# Patient Record
Sex: FEMALE | Race: BLACK OR AFRICAN AMERICAN | NOT HISPANIC OR LATINO | Employment: FULL TIME | URBAN - METROPOLITAN AREA
[De-identification: names, ages, dates, MRNs, and addresses within clinical notes are randomized per-mention and may not be internally consistent; named-entity substitution may affect disease eponyms.]

---

## 2019-02-19 ENCOUNTER — OFFICE VISIT (OUTPATIENT)
Dept: ENDOCRINOLOGY | Facility: CLINIC | Age: 34
End: 2019-02-19
Payer: COMMERCIAL

## 2019-02-19 ENCOUNTER — HOSPITAL ENCOUNTER (OUTPATIENT)
Dept: RADIOLOGY | Facility: HOSPITAL | Age: 34
Discharge: HOME | End: 2019-02-19
Attending: NURSE PRACTITIONER
Payer: COMMERCIAL

## 2019-02-19 VITALS
SYSTOLIC BLOOD PRESSURE: 128 MMHG | WEIGHT: 203.94 LBS | HEART RATE: 79 BPM | BODY MASS INDEX: 32.78 KG/M2 | DIASTOLIC BLOOD PRESSURE: 82 MMHG | HEIGHT: 66 IN

## 2019-02-19 DIAGNOSIS — E01.0 THYROMEGALY: Primary | ICD-10-CM

## 2019-02-19 DIAGNOSIS — E01.0 THYROMEGALY: ICD-10-CM

## 2019-02-19 PROCEDURE — 76536 US EXAM OF HEAD AND NECK: CPT | Mod: TC

## 2019-02-19 PROCEDURE — 3008F BODY MASS INDEX DOCD: CPT | Mod: CPTII,S$GLB,, | Performed by: NURSE PRACTITIONER

## 2019-02-19 PROCEDURE — 99999 PR PBB SHADOW E&M-NEW PATIENT-LVL III: CPT | Mod: PBBFAC,,, | Performed by: NURSE PRACTITIONER

## 2019-02-19 PROCEDURE — 99204 PR OFFICE/OUTPT VISIT, NEW, LEVL IV, 45-59 MIN: ICD-10-PCS | Mod: S$GLB,,, | Performed by: NURSE PRACTITIONER

## 2019-02-19 PROCEDURE — 99204 OFFICE O/P NEW MOD 45 MIN: CPT | Mod: S$GLB,,, | Performed by: NURSE PRACTITIONER

## 2019-02-19 PROCEDURE — 3008F PR BODY MASS INDEX (BMI) DOCUMENTED: ICD-10-PCS | Mod: CPTII,S$GLB,, | Performed by: NURSE PRACTITIONER

## 2019-02-19 PROCEDURE — 99999 PR PBB SHADOW E&M-NEW PATIENT-LVL III: ICD-10-PCS | Mod: PBBFAC,,, | Performed by: NURSE PRACTITIONER

## 2019-02-19 PROCEDURE — 76536 US EXAM OF HEAD AND NECK: CPT | Mod: 26,,, | Performed by: RADIOLOGY

## 2019-02-19 PROCEDURE — 76536 US SOFT TISSUE HEAD NECK THYROID: ICD-10-PCS | Mod: 26,,, | Performed by: RADIOLOGY

## 2019-02-19 RX ORDER — AMLODIPINE BESYLATE 5 MG/1
TABLET ORAL
COMMUNITY
Start: 2018-02-12 | End: 2021-08-20

## 2019-02-19 RX ORDER — LISINOPRIL 10 MG/1
TABLET ORAL
COMMUNITY
Start: 2017-09-07 | End: 2021-08-20

## 2019-02-19 RX ORDER — METFORMIN HYDROCHLORIDE 500 MG/5ML
500 SOLUTION ORAL DAILY
COMMUNITY
Start: 2018-07-09 | End: 2021-08-20

## 2019-02-19 RX ORDER — NEBIVOLOL 10 MG/1
TABLET ORAL
COMMUNITY
Start: 2018-10-01 | End: 2021-08-20

## 2019-02-19 NOTE — PROGRESS NOTES
Subjective:      Patient ID: Karuna Sandoval is a 34 y.o. female.    Chief Complaint:  Thyroid Problem (New patient )      History of Present Illness  Karuna Sandoval presents today for Evaluation & management of a possible thyroid nodule. This is her first visit with me.     The thyroid nodule was found on palpation by pcp   No thyroid u/s done     The patient was not aware of the thyroid nodule prior   No difficulty breathing   + dysphagia- liquids   + voice changes- hoarseness   + FH of thyroid cancer- maternal aunt   No personal history of radiation treatment or exposure     No signs or symptoms of hyper or hypothyroidism    + weight gain, then loss without trying   No bowel changes  No heat or cold intolerance   No hair nail or skin changes  No cp, palpations or sob    Per pt TFTs were checked & were normal.     + thyroid tenderness    She went to the gynecologist & all her hormones were normal. She had a partial hysterectomy.     On metformin for elevated A1c from PCP.    Review of Systems   Constitutional: Positive for fatigue and unexpected weight change.   Eyes: Negative for visual disturbance.   Respiratory: Negative for cough and shortness of breath.    Cardiovascular: Negative for chest pain.   Gastrointestinal: Negative for abdominal pain.   Endocrine: Negative for cold intolerance, heat intolerance, polydipsia, polyphagia and polyuria.   Musculoskeletal: Negative for arthralgias.   Skin: Negative for wound.   Neurological: Negative for headaches.   Hematological: Does not bruise/bleed easily.   Psychiatric/Behavioral: Negative for sleep disturbance.     Objective:   Physical Exam   Constitutional: She appears well-developed.   HENT:   Right Ear: External ear normal.   Left Ear: External ear normal.   Nose: Nose normal.   Hearing Normal     Neck: No tracheal deviation present. Thyromegaly present.   Cardiovascular: Normal rate.   No murmur heard.  No edema present   Pulmonary/Chest: Effort  normal and breath sounds normal.   Abdominal: Soft. She exhibits no mass. No hernia.   Neurological: She is alert. No cranial nerve deficit or sensory deficit.   Skin: No rash noted.   No nodules.   Psychiatric: She has a normal mood and affect. Judgment normal.   Vitals reviewed.    Body mass index is 32.91 kg/m².    Lab Review:   No results found for: HGBA1C  No results found for: CHOL, HDL, LDLCALC, TRIG, CHOLHDL  No results found for: NA, K, CL, CO2, GLU, BUN, CREATININE, CALCIUM, PROT, ALBUMIN, BILITOT, ALKPHOS, AST, ALT, ANIONGAP, ESTGFRAFRICA, EGFRNONAA, TSH    Assessment and Plan     1. Thyromegaly  TSH    T4, free    Thyroid peroxidase antibody    Thyroid stimulating immunoglobulin    Vitamin D    Hemoglobin A1c    US Soft Tissue Head Neck Thyroid    Follicle stimulating hormone    Luteinizing hormone    Prolactin    Testosterone    DHEA-sulfate    17-Hydroxyprogesterone       Thyromegaly  -- Schedule thyroid US, check TFTs & antibodies today   -- if nodules found that meet criteria for biopsy will proceed with FNA. Discussed with patient.     -- above labs today, ensure that her hormone levels are normal per patient request   -- will message with results

## 2019-02-19 NOTE — ASSESSMENT & PLAN NOTE
-- Schedule thyroid US, check TFTs & antibodies today   -- if nodules found that meet criteria for biopsy will proceed with FNA. Discussed with patient.     -- above labs today, ensure that her hormone levels are normal per patient request   -- will message with results

## 2019-02-20 ENCOUNTER — PATIENT MESSAGE (OUTPATIENT)
Dept: ENDOCRINOLOGY | Facility: CLINIC | Age: 34
End: 2019-02-20

## 2019-02-25 DIAGNOSIS — E55.9 VITAMIN D DEFICIENCY: Primary | ICD-10-CM

## 2019-02-25 RX ORDER — ERGOCALCIFEROL 1.25 MG/1
50000 CAPSULE ORAL
Qty: 12 CAPSULE | Refills: 3 | Status: SHIPPED | OUTPATIENT
Start: 2019-02-25 | End: 2021-08-20

## 2021-04-16 ENCOUNTER — PATIENT MESSAGE (OUTPATIENT)
Dept: RESEARCH | Facility: HOSPITAL | Age: 36
End: 2021-04-16

## 2021-05-31 ENCOUNTER — HOSPITAL ENCOUNTER (EMERGENCY)
Facility: HOSPITAL | Age: 36
Discharge: HOME | End: 2021-05-31
Attending: EMERGENCY MEDICINE
Payer: COMMERCIAL

## 2021-05-31 VITALS
OXYGEN SATURATION: 99 % | BODY MASS INDEX: 32.95 KG/M2 | HEIGHT: 66 IN | TEMPERATURE: 99 F | SYSTOLIC BLOOD PRESSURE: 123 MMHG | DIASTOLIC BLOOD PRESSURE: 84 MMHG | RESPIRATION RATE: 18 BRPM | HEART RATE: 80 BPM | WEIGHT: 205 LBS

## 2021-05-31 DIAGNOSIS — R21 RASH AND NONSPECIFIC SKIN ERUPTION: Primary | ICD-10-CM

## 2021-05-31 PROCEDURE — 99284 EMERGENCY DEPT VISIT MOD MDM: CPT

## 2021-05-31 RX ORDER — PREDNISONE 50 MG/1
50 TABLET ORAL DAILY
Qty: 3 TABLET | Refills: 0 | Status: SHIPPED | OUTPATIENT
Start: 2021-06-01 | End: 2021-06-04

## 2021-05-31 RX ORDER — CEPHALEXIN 500 MG/1
500 CAPSULE ORAL EVERY 8 HOURS
Qty: 15 CAPSULE | Refills: 0 | Status: SHIPPED | OUTPATIENT
Start: 2021-05-31 | End: 2021-06-05

## 2021-06-02 ENCOUNTER — OFFICE VISIT (OUTPATIENT)
Dept: DERMATOLOGY | Facility: CLINIC | Age: 36
End: 2021-06-02
Payer: COMMERCIAL

## 2021-06-02 DIAGNOSIS — L73.9 FOLLICULITIS: ICD-10-CM

## 2021-06-02 DIAGNOSIS — L40.9 PSORIASIS: Primary | ICD-10-CM

## 2021-06-02 DIAGNOSIS — L25.9 CONTACT DERMATITIS, UNSPECIFIED CONTACT DERMATITIS TYPE, UNSPECIFIED TRIGGER: ICD-10-CM

## 2021-06-02 PROCEDURE — 99204 PR OFFICE/OUTPT VISIT, NEW, LEVL IV, 45-59 MIN: ICD-10-PCS | Mod: S$GLB,,, | Performed by: DERMATOLOGY

## 2021-06-02 PROCEDURE — 1126F PR PAIN SEVERITY QUANTIFIED, NO PAIN PRESENT: ICD-10-PCS | Mod: S$GLB,,, | Performed by: DERMATOLOGY

## 2021-06-02 PROCEDURE — 99204 OFFICE O/P NEW MOD 45 MIN: CPT | Mod: S$GLB,,, | Performed by: DERMATOLOGY

## 2021-06-02 PROCEDURE — 1126F AMNT PAIN NOTED NONE PRSNT: CPT | Mod: S$GLB,,, | Performed by: DERMATOLOGY

## 2021-06-02 PROCEDURE — 99999 PR PBB SHADOW E&M-EST. PATIENT-LVL III: CPT | Mod: PBBFAC,,, | Performed by: DERMATOLOGY

## 2021-06-02 PROCEDURE — 99999 PR PBB SHADOW E&M-EST. PATIENT-LVL III: ICD-10-PCS | Mod: PBBFAC,,, | Performed by: DERMATOLOGY

## 2021-06-02 RX ORDER — FLUOCINONIDE TOPICAL SOLUTION USP, 0.05% 0.5 MG/ML
SOLUTION TOPICAL 2 TIMES DAILY
Qty: 60 ML | Refills: 3 | Status: SHIPPED | OUTPATIENT
Start: 2021-06-02 | End: 2021-10-15

## 2021-06-02 RX ORDER — TRIAMCINOLONE ACETONIDE 1 MG/G
OINTMENT TOPICAL 2 TIMES DAILY
Qty: 454 G | Refills: 3 | Status: SHIPPED | OUTPATIENT
Start: 2021-06-02 | End: 2021-10-15

## 2021-06-02 RX ORDER — KETOCONAZOLE 20 MG/ML
SHAMPOO, SUSPENSION TOPICAL
Qty: 120 ML | Refills: 4 | Status: SHIPPED | OUTPATIENT
Start: 2021-06-03 | End: 2021-10-15

## 2021-06-05 ENCOUNTER — PATIENT MESSAGE (OUTPATIENT)
Dept: DERMATOLOGY | Facility: CLINIC | Age: 36
End: 2021-06-05

## 2021-06-09 ENCOUNTER — TELEPHONE (OUTPATIENT)
Dept: DERMATOLOGY | Facility: CLINIC | Age: 36
End: 2021-06-09

## 2021-06-09 ENCOUNTER — TELEPHONE (OUTPATIENT)
Dept: ALLERGY | Facility: CLINIC | Age: 36
End: 2021-06-09

## 2021-06-09 RX ORDER — HYDROXYZINE HYDROCHLORIDE 25 MG/1
25 TABLET, FILM COATED ORAL 3 TIMES DAILY PRN
Qty: 30 TABLET | Refills: 0 | Status: SHIPPED | OUTPATIENT
Start: 2021-06-09 | End: 2021-06-14 | Stop reason: SDUPTHER

## 2021-06-14 ENCOUNTER — OFFICE VISIT (OUTPATIENT)
Dept: DERMATOLOGY | Facility: CLINIC | Age: 36
End: 2021-06-14
Payer: COMMERCIAL

## 2021-06-14 DIAGNOSIS — L25.9 CONTACT DERMATITIS, UNSPECIFIED CONTACT DERMATITIS TYPE, UNSPECIFIED TRIGGER: Primary | ICD-10-CM

## 2021-06-14 DIAGNOSIS — L29.9 PRURITUS: ICD-10-CM

## 2021-06-14 PROCEDURE — 96372 THER/PROPH/DIAG INJ SC/IM: CPT | Mod: S$GLB,,, | Performed by: STUDENT IN AN ORGANIZED HEALTH CARE EDUCATION/TRAINING PROGRAM

## 2021-06-14 PROCEDURE — 96372 PR INJECTION,THERAP/PROPH/DIAG2ST, IM OR SUBCUT: ICD-10-PCS | Mod: S$GLB,,, | Performed by: STUDENT IN AN ORGANIZED HEALTH CARE EDUCATION/TRAINING PROGRAM

## 2021-06-14 PROCEDURE — 99214 OFFICE O/P EST MOD 30 MIN: CPT | Mod: 25,S$GLB,, | Performed by: DERMATOLOGY

## 2021-06-14 PROCEDURE — 99214 PR OFFICE/OUTPT VISIT, EST, LEVL IV, 30-39 MIN: ICD-10-PCS | Mod: 25,S$GLB,, | Performed by: DERMATOLOGY

## 2021-06-14 PROCEDURE — 99999 PR PBB SHADOW E&M-EST. PATIENT-LVL I: ICD-10-PCS | Mod: PBBFAC,,,

## 2021-06-14 PROCEDURE — 99999 PR PBB SHADOW E&M-EST. PATIENT-LVL I: CPT | Mod: PBBFAC,,,

## 2021-06-14 RX ORDER — HYDROXYZINE HYDROCHLORIDE 25 MG/1
25 TABLET, FILM COATED ORAL 3 TIMES DAILY PRN
Qty: 30 TABLET | Refills: 1 | Status: SHIPPED | OUTPATIENT
Start: 2021-06-14 | End: 2021-08-20

## 2021-06-14 RX ORDER — TRIAMCINOLONE ACETONIDE 40 MG/ML
40 INJECTION, SUSPENSION INTRA-ARTICULAR; INTRAMUSCULAR
Status: COMPLETED | OUTPATIENT
Start: 2021-06-14 | End: 2021-06-14

## 2021-06-14 RX ADMIN — TRIAMCINOLONE ACETONIDE 40 MG: 40 INJECTION, SUSPENSION INTRA-ARTICULAR; INTRAMUSCULAR at 02:06

## 2021-07-12 ENCOUNTER — CLINICAL SUPPORT (OUTPATIENT)
Dept: DERMATOLOGY | Facility: CLINIC | Age: 36
End: 2021-07-12
Payer: COMMERCIAL

## 2021-07-12 DIAGNOSIS — L25.9 CONTACT DERMATITIS, UNSPECIFIED CONTACT DERMATITIS TYPE, UNSPECIFIED TRIGGER: ICD-10-CM

## 2021-07-12 PROCEDURE — 95044 PATCH/APPLICATION TESTS: CPT | Mod: S$GLB,,, | Performed by: DERMATOLOGY

## 2021-07-12 PROCEDURE — 95044 PR ALLERGY PATCH TESTS: ICD-10-PCS | Mod: S$GLB,,, | Performed by: DERMATOLOGY

## 2021-07-14 ENCOUNTER — CLINICAL SUPPORT (OUTPATIENT)
Dept: DERMATOLOGY | Facility: CLINIC | Age: 36
End: 2021-07-14
Payer: COMMERCIAL

## 2021-07-14 DIAGNOSIS — L25.9 CONTACT DERMATITIS, UNSPECIFIED CONTACT DERMATITIS TYPE, UNSPECIFIED TRIGGER: Primary | ICD-10-CM

## 2021-07-14 PROCEDURE — 99212 PR OFFICE/OUTPT VISIT, EST, LEVL II, 10-19 MIN: ICD-10-PCS | Mod: S$GLB,,, | Performed by: DERMATOLOGY

## 2021-07-14 PROCEDURE — 99212 OFFICE O/P EST SF 10 MIN: CPT | Mod: S$GLB,,, | Performed by: DERMATOLOGY

## 2021-07-16 ENCOUNTER — CLINICAL SUPPORT (OUTPATIENT)
Dept: DERMATOLOGY | Facility: CLINIC | Age: 36
End: 2021-07-16
Payer: COMMERCIAL

## 2021-07-16 ENCOUNTER — OFFICE VISIT (OUTPATIENT)
Dept: URGENT CARE | Facility: CLINIC | Age: 36
End: 2021-07-16
Payer: COMMERCIAL

## 2021-07-16 VITALS
SYSTOLIC BLOOD PRESSURE: 141 MMHG | OXYGEN SATURATION: 98 % | TEMPERATURE: 99 F | RESPIRATION RATE: 16 BRPM | HEIGHT: 66 IN | HEART RATE: 95 BPM | DIASTOLIC BLOOD PRESSURE: 100 MMHG | WEIGHT: 167 LBS | BODY MASS INDEX: 26.84 KG/M2

## 2021-07-16 DIAGNOSIS — L25.9 CONTACT DERMATITIS, UNSPECIFIED CONTACT DERMATITIS TYPE, UNSPECIFIED TRIGGER: Primary | ICD-10-CM

## 2021-07-16 DIAGNOSIS — Z20.822 ENCOUNTER FOR LABORATORY TESTING FOR COVID-19 VIRUS: ICD-10-CM

## 2021-07-16 DIAGNOSIS — Z20.822 EXPOSURE TO COVID-19 VIRUS: Primary | ICD-10-CM

## 2021-07-16 LAB
CTP QC/QA: YES
SARS-COV-2 RDRP RESP QL NAA+PROBE: NEGATIVE

## 2021-07-16 PROCEDURE — 99212 PR OFFICE/OUTPT VISIT, EST, LEVL II, 10-19 MIN: ICD-10-PCS | Mod: S$GLB,,, | Performed by: DERMATOLOGY

## 2021-07-16 PROCEDURE — 99203 OFFICE O/P NEW LOW 30 MIN: CPT | Mod: S$GLB,,, | Performed by: NURSE PRACTITIONER

## 2021-07-16 PROCEDURE — 99212 OFFICE O/P EST SF 10 MIN: CPT | Mod: S$GLB,,, | Performed by: DERMATOLOGY

## 2021-07-16 PROCEDURE — 3008F PR BODY MASS INDEX (BMI) DOCUMENTED: ICD-10-PCS | Mod: CPTII,S$GLB,, | Performed by: NURSE PRACTITIONER

## 2021-07-16 PROCEDURE — U0002 COVID-19 LAB TEST NON-CDC: HCPCS | Mod: QW,S$GLB,, | Performed by: NURSE PRACTITIONER

## 2021-07-16 PROCEDURE — 3008F BODY MASS INDEX DOCD: CPT | Mod: CPTII,S$GLB,, | Performed by: NURSE PRACTITIONER

## 2021-07-16 PROCEDURE — U0002: ICD-10-PCS | Mod: QW,S$GLB,, | Performed by: NURSE PRACTITIONER

## 2021-07-16 PROCEDURE — 99203 PR OFFICE/OUTPT VISIT, NEW, LEVL III, 30-44 MIN: ICD-10-PCS | Mod: S$GLB,,, | Performed by: NURSE PRACTITIONER

## 2021-07-29 ENCOUNTER — OFFICE VISIT (OUTPATIENT)
Dept: URGENT CARE | Facility: CLINIC | Age: 36
End: 2021-07-29
Payer: COMMERCIAL

## 2021-07-29 VITALS
TEMPERATURE: 99 F | OXYGEN SATURATION: 96 % | SYSTOLIC BLOOD PRESSURE: 126 MMHG | RESPIRATION RATE: 18 BRPM | HEART RATE: 98 BPM | DIASTOLIC BLOOD PRESSURE: 93 MMHG

## 2021-07-29 DIAGNOSIS — J34.9 SINUS PROBLEM: Primary | ICD-10-CM

## 2021-07-29 DIAGNOSIS — J06.9 URI, ACUTE: ICD-10-CM

## 2021-07-29 LAB
CTP QC/QA: YES
SARS-COV-2 RDRP RESP QL NAA+PROBE: NEGATIVE

## 2021-07-29 PROCEDURE — 3074F PR MOST RECENT SYSTOLIC BLOOD PRESSURE < 130 MM HG: ICD-10-PCS | Mod: CPTII,S$GLB,, | Performed by: FAMILY MEDICINE

## 2021-07-29 PROCEDURE — 3080F PR MOST RECENT DIASTOLIC BLOOD PRESSURE >= 90 MM HG: ICD-10-PCS | Mod: CPTII,S$GLB,, | Performed by: FAMILY MEDICINE

## 2021-07-29 PROCEDURE — 1159F MED LIST DOCD IN RCRD: CPT | Mod: CPTII,S$GLB,, | Performed by: FAMILY MEDICINE

## 2021-07-29 PROCEDURE — 1159F PR MEDICATION LIST DOCUMENTED IN MEDICAL RECORD: ICD-10-PCS | Mod: CPTII,S$GLB,, | Performed by: FAMILY MEDICINE

## 2021-07-29 PROCEDURE — 3080F DIAST BP >= 90 MM HG: CPT | Mod: CPTII,S$GLB,, | Performed by: FAMILY MEDICINE

## 2021-07-29 PROCEDURE — 99213 PR OFFICE/OUTPT VISIT, EST, LEVL III, 20-29 MIN: ICD-10-PCS | Mod: S$GLB,,, | Performed by: FAMILY MEDICINE

## 2021-07-29 PROCEDURE — U0002 COVID-19 LAB TEST NON-CDC: HCPCS | Mod: QW,S$GLB,, | Performed by: FAMILY MEDICINE

## 2021-07-29 PROCEDURE — 1160F RVW MEDS BY RX/DR IN RCRD: CPT | Mod: CPTII,S$GLB,, | Performed by: FAMILY MEDICINE

## 2021-07-29 PROCEDURE — 1160F PR REVIEW ALL MEDS BY PRESCRIBER/CLIN PHARMACIST DOCUMENTED: ICD-10-PCS | Mod: CPTII,S$GLB,, | Performed by: FAMILY MEDICINE

## 2021-07-29 PROCEDURE — U0002: ICD-10-PCS | Mod: QW,S$GLB,, | Performed by: FAMILY MEDICINE

## 2021-07-29 PROCEDURE — 3074F SYST BP LT 130 MM HG: CPT | Mod: CPTII,S$GLB,, | Performed by: FAMILY MEDICINE

## 2021-07-29 PROCEDURE — 99213 OFFICE O/P EST LOW 20 MIN: CPT | Mod: S$GLB,,, | Performed by: FAMILY MEDICINE

## 2021-08-20 ENCOUNTER — OFFICE VISIT (OUTPATIENT)
Dept: FAMILY MEDICINE | Facility: CLINIC | Age: 36
End: 2021-08-20
Payer: COMMERCIAL

## 2021-08-20 VITALS
HEIGHT: 66 IN | DIASTOLIC BLOOD PRESSURE: 94 MMHG | WEIGHT: 207.25 LBS | BODY MASS INDEX: 33.31 KG/M2 | OXYGEN SATURATION: 97 % | SYSTOLIC BLOOD PRESSURE: 122 MMHG | HEART RATE: 81 BPM

## 2021-08-20 DIAGNOSIS — Z11.4 ENCOUNTER FOR SCREENING FOR HIV: ICD-10-CM

## 2021-08-20 DIAGNOSIS — R35.0 URINARY FREQUENCY: ICD-10-CM

## 2021-08-20 DIAGNOSIS — Z11.59 NEED FOR HEPATITIS C SCREENING TEST: ICD-10-CM

## 2021-08-20 DIAGNOSIS — F51.01 PRIMARY INSOMNIA: ICD-10-CM

## 2021-08-20 DIAGNOSIS — E66.09 CLASS 1 OBESITY DUE TO EXCESS CALORIES WITH SERIOUS COMORBIDITY AND BODY MASS INDEX (BMI) OF 33.0 TO 33.9 IN ADULT: ICD-10-CM

## 2021-08-20 DIAGNOSIS — Z11.3 ROUTINE SCREENING FOR STI (SEXUALLY TRANSMITTED INFECTION): ICD-10-CM

## 2021-08-20 DIAGNOSIS — I10 ESSENTIAL HYPERTENSION: Primary | ICD-10-CM

## 2021-08-20 PROCEDURE — 4010F PR ACE/ARB THEARPY RXD/TAKEN: ICD-10-PCS | Mod: CPTII,S$GLB,, | Performed by: FAMILY MEDICINE

## 2021-08-20 PROCEDURE — 3074F SYST BP LT 130 MM HG: CPT | Mod: CPTII,S$GLB,, | Performed by: FAMILY MEDICINE

## 2021-08-20 PROCEDURE — 3074F PR MOST RECENT SYSTOLIC BLOOD PRESSURE < 130 MM HG: ICD-10-PCS | Mod: CPTII,S$GLB,, | Performed by: FAMILY MEDICINE

## 2021-08-20 PROCEDURE — 99214 PR OFFICE/OUTPT VISIT, EST, LEVL IV, 30-39 MIN: ICD-10-PCS | Mod: S$GLB,,, | Performed by: FAMILY MEDICINE

## 2021-08-20 PROCEDURE — 3080F PR MOST RECENT DIASTOLIC BLOOD PRESSURE >= 90 MM HG: ICD-10-PCS | Mod: CPTII,S$GLB,, | Performed by: FAMILY MEDICINE

## 2021-08-20 PROCEDURE — 1159F PR MEDICATION LIST DOCUMENTED IN MEDICAL RECORD: ICD-10-PCS | Mod: CPTII,S$GLB,, | Performed by: FAMILY MEDICINE

## 2021-08-20 PROCEDURE — 1160F RVW MEDS BY RX/DR IN RCRD: CPT | Mod: CPTII,S$GLB,, | Performed by: FAMILY MEDICINE

## 2021-08-20 PROCEDURE — 99214 OFFICE O/P EST MOD 30 MIN: CPT | Mod: S$GLB,,, | Performed by: FAMILY MEDICINE

## 2021-08-20 PROCEDURE — 3008F BODY MASS INDEX DOCD: CPT | Mod: CPTII,S$GLB,, | Performed by: FAMILY MEDICINE

## 2021-08-20 PROCEDURE — 99999 PR PBB SHADOW E&M-EST. PATIENT-LVL IV: ICD-10-PCS | Mod: PBBFAC,,, | Performed by: FAMILY MEDICINE

## 2021-08-20 PROCEDURE — 4010F ACE/ARB THERAPY RXD/TAKEN: CPT | Mod: CPTII,S$GLB,, | Performed by: FAMILY MEDICINE

## 2021-08-20 PROCEDURE — 1160F PR REVIEW ALL MEDS BY PRESCRIBER/CLIN PHARMACIST DOCUMENTED: ICD-10-PCS | Mod: CPTII,S$GLB,, | Performed by: FAMILY MEDICINE

## 2021-08-20 PROCEDURE — 1159F MED LIST DOCD IN RCRD: CPT | Mod: CPTII,S$GLB,, | Performed by: FAMILY MEDICINE

## 2021-08-20 PROCEDURE — 99999 PR PBB SHADOW E&M-EST. PATIENT-LVL IV: CPT | Mod: PBBFAC,,, | Performed by: FAMILY MEDICINE

## 2021-08-20 PROCEDURE — 3080F DIAST BP >= 90 MM HG: CPT | Mod: CPTII,S$GLB,, | Performed by: FAMILY MEDICINE

## 2021-08-20 PROCEDURE — 3008F PR BODY MASS INDEX (BMI) DOCUMENTED: ICD-10-PCS | Mod: CPTII,S$GLB,, | Performed by: FAMILY MEDICINE

## 2021-08-20 RX ORDER — FLUTICASONE PROPIONATE 50 UG/1
2 SPRAY, METERED NASAL 2 TIMES DAILY
COMMUNITY
Start: 2021-05-12 | End: 2022-10-13 | Stop reason: SDUPTHER

## 2021-08-20 RX ORDER — HYDROXYZINE PAMOATE 50 MG/1
50 CAPSULE ORAL NIGHTLY PRN
Qty: 90 CAPSULE | Refills: 0 | Status: SHIPPED | OUTPATIENT
Start: 2021-08-20 | End: 2021-10-15

## 2021-08-20 RX ORDER — LISINOPRIL 30 MG/1
30 TABLET ORAL DAILY
COMMUNITY
Start: 2021-05-12 | End: 2021-08-20 | Stop reason: SDUPTHER

## 2021-08-20 RX ORDER — AMLODIPINE BESYLATE 10 MG/1
10 TABLET ORAL DAILY
COMMUNITY
Start: 2021-05-12 | End: 2021-08-20 | Stop reason: SDUPTHER

## 2021-08-20 RX ORDER — AMLODIPINE BESYLATE 10 MG/1
10 TABLET ORAL DAILY
Qty: 90 TABLET | Refills: 0 | Status: SHIPPED | OUTPATIENT
Start: 2021-08-20 | End: 2021-10-15

## 2021-08-20 RX ORDER — LEVOCETIRIZINE DIHYDROCHLORIDE 5 MG/1
5 TABLET, FILM COATED ORAL DAILY PRN
COMMUNITY
Start: 2021-05-12 | End: 2022-03-29 | Stop reason: SDUPTHER

## 2021-08-20 RX ORDER — LINACLOTIDE 72 UG/1
72 CAPSULE, GELATIN COATED ORAL DAILY
COMMUNITY
Start: 2021-05-12 | End: 2023-10-26 | Stop reason: SDUPTHER

## 2021-08-20 RX ORDER — LISINOPRIL 40 MG/1
40 TABLET ORAL DAILY
Qty: 90 TABLET | Refills: 3 | Status: SHIPPED | OUTPATIENT
Start: 2021-08-20 | End: 2021-12-06 | Stop reason: SDUPTHER

## 2021-10-11 ENCOUNTER — LAB VISIT (OUTPATIENT)
Dept: LAB | Facility: HOSPITAL | Age: 36
End: 2021-10-11
Attending: FAMILY MEDICINE
Payer: COMMERCIAL

## 2021-10-11 DIAGNOSIS — R35.0 URINARY FREQUENCY: ICD-10-CM

## 2021-10-11 DIAGNOSIS — Z11.3 ROUTINE SCREENING FOR STI (SEXUALLY TRANSMITTED INFECTION): ICD-10-CM

## 2021-10-11 LAB
BILIRUB UR QL STRIP: NEGATIVE
CLARITY UR REFRACT.AUTO: ABNORMAL
COLOR UR AUTO: YELLOW
GLUCOSE UR QL STRIP: NEGATIVE
HGB UR QL STRIP: NEGATIVE
KETONES UR QL STRIP: NEGATIVE
LEUKOCYTE ESTERASE UR QL STRIP: NEGATIVE
NITRITE UR QL STRIP: NEGATIVE
PH UR STRIP: 6 [PH] (ref 5–8)
PROT UR QL STRIP: NEGATIVE
SP GR UR STRIP: 1.02 (ref 1–1.03)
URN SPEC COLLECT METH UR: ABNORMAL

## 2021-10-11 PROCEDURE — 87086 URINE CULTURE/COLONY COUNT: CPT | Performed by: FAMILY MEDICINE

## 2021-10-11 PROCEDURE — 87088 URINE BACTERIA CULTURE: CPT | Performed by: FAMILY MEDICINE

## 2021-10-11 PROCEDURE — 81003 URINALYSIS AUTO W/O SCOPE: CPT | Performed by: FAMILY MEDICINE

## 2021-10-11 PROCEDURE — 87591 N.GONORRHOEAE DNA AMP PROB: CPT | Performed by: FAMILY MEDICINE

## 2021-10-11 PROCEDURE — 87491 CHLMYD TRACH DNA AMP PROBE: CPT | Performed by: FAMILY MEDICINE

## 2021-10-11 PROCEDURE — 87077 CULTURE AEROBIC IDENTIFY: CPT | Performed by: FAMILY MEDICINE

## 2021-10-11 PROCEDURE — 87186 SC STD MICRODIL/AGAR DIL: CPT | Performed by: FAMILY MEDICINE

## 2021-10-12 LAB
C TRACH DNA SPEC QL NAA+PROBE: NOT DETECTED
N GONORRHOEA DNA SPEC QL NAA+PROBE: NOT DETECTED

## 2021-10-14 LAB — BACTERIA UR CULT: ABNORMAL

## 2021-10-15 ENCOUNTER — OFFICE VISIT (OUTPATIENT)
Dept: FAMILY MEDICINE | Facility: CLINIC | Age: 36
End: 2021-10-15
Payer: COMMERCIAL

## 2021-10-15 VITALS
DIASTOLIC BLOOD PRESSURE: 86 MMHG | BODY MASS INDEX: 34.68 KG/M2 | HEIGHT: 66 IN | WEIGHT: 215.81 LBS | OXYGEN SATURATION: 97 % | HEART RATE: 86 BPM | SYSTOLIC BLOOD PRESSURE: 116 MMHG

## 2021-10-15 DIAGNOSIS — R71.8 MICROCYTOSIS: ICD-10-CM

## 2021-10-15 DIAGNOSIS — F51.01 PRIMARY INSOMNIA: ICD-10-CM

## 2021-10-15 DIAGNOSIS — I10 ESSENTIAL HYPERTENSION: Primary | ICD-10-CM

## 2021-10-15 DIAGNOSIS — F43.21 ADJUSTMENT DISORDER WITH DEPRESSED MOOD: ICD-10-CM

## 2021-10-15 DIAGNOSIS — Z28.21 INFLUENZA VACCINATION DECLINED: ICD-10-CM

## 2021-10-15 DIAGNOSIS — E55.9 VITAMIN D DEFICIENCY: ICD-10-CM

## 2021-10-15 PROCEDURE — 99214 OFFICE O/P EST MOD 30 MIN: CPT | Mod: S$GLB,,, | Performed by: FAMILY MEDICINE

## 2021-10-15 PROCEDURE — 1159F PR MEDICATION LIST DOCUMENTED IN MEDICAL RECORD: ICD-10-PCS | Mod: CPTII,S$GLB,, | Performed by: FAMILY MEDICINE

## 2021-10-15 PROCEDURE — 3074F SYST BP LT 130 MM HG: CPT | Mod: CPTII,S$GLB,, | Performed by: FAMILY MEDICINE

## 2021-10-15 PROCEDURE — 3044F PR MOST RECENT HEMOGLOBIN A1C LEVEL <7.0%: ICD-10-PCS | Mod: CPTII,S$GLB,, | Performed by: FAMILY MEDICINE

## 2021-10-15 PROCEDURE — 1160F PR REVIEW ALL MEDS BY PRESCRIBER/CLIN PHARMACIST DOCUMENTED: ICD-10-PCS | Mod: CPTII,S$GLB,, | Performed by: FAMILY MEDICINE

## 2021-10-15 PROCEDURE — 99214 PR OFFICE/OUTPT VISIT, EST, LEVL IV, 30-39 MIN: ICD-10-PCS | Mod: S$GLB,,, | Performed by: FAMILY MEDICINE

## 2021-10-15 PROCEDURE — 1160F RVW MEDS BY RX/DR IN RCRD: CPT | Mod: CPTII,S$GLB,, | Performed by: FAMILY MEDICINE

## 2021-10-15 PROCEDURE — 99999 PR PBB SHADOW E&M-EST. PATIENT-LVL III: ICD-10-PCS | Mod: PBBFAC,,, | Performed by: FAMILY MEDICINE

## 2021-10-15 PROCEDURE — 3008F BODY MASS INDEX DOCD: CPT | Mod: CPTII,S$GLB,, | Performed by: FAMILY MEDICINE

## 2021-10-15 PROCEDURE — 3079F DIAST BP 80-89 MM HG: CPT | Mod: CPTII,S$GLB,, | Performed by: FAMILY MEDICINE

## 2021-10-15 PROCEDURE — 4010F PR ACE/ARB THEARPY RXD/TAKEN: ICD-10-PCS | Mod: CPTII,S$GLB,, | Performed by: FAMILY MEDICINE

## 2021-10-15 PROCEDURE — 3074F PR MOST RECENT SYSTOLIC BLOOD PRESSURE < 130 MM HG: ICD-10-PCS | Mod: CPTII,S$GLB,, | Performed by: FAMILY MEDICINE

## 2021-10-15 PROCEDURE — 3044F HG A1C LEVEL LT 7.0%: CPT | Mod: CPTII,S$GLB,, | Performed by: FAMILY MEDICINE

## 2021-10-15 PROCEDURE — 3008F PR BODY MASS INDEX (BMI) DOCUMENTED: ICD-10-PCS | Mod: CPTII,S$GLB,, | Performed by: FAMILY MEDICINE

## 2021-10-15 PROCEDURE — 4010F ACE/ARB THERAPY RXD/TAKEN: CPT | Mod: CPTII,S$GLB,, | Performed by: FAMILY MEDICINE

## 2021-10-15 PROCEDURE — 3079F PR MOST RECENT DIASTOLIC BLOOD PRESSURE 80-89 MM HG: ICD-10-PCS | Mod: CPTII,S$GLB,, | Performed by: FAMILY MEDICINE

## 2021-10-15 PROCEDURE — 1159F MED LIST DOCD IN RCRD: CPT | Mod: CPTII,S$GLB,, | Performed by: FAMILY MEDICINE

## 2021-10-15 PROCEDURE — 99999 PR PBB SHADOW E&M-EST. PATIENT-LVL III: CPT | Mod: PBBFAC,,, | Performed by: FAMILY MEDICINE

## 2021-10-15 RX ORDER — HYDROCHLOROTHIAZIDE 25 MG/1
TABLET ORAL
Qty: 90 TABLET | Refills: 3 | Status: SHIPPED | OUTPATIENT
Start: 2021-10-15 | End: 2021-12-06 | Stop reason: SDUPTHER

## 2021-10-15 RX ORDER — ERGOCALCIFEROL 1.25 MG/1
50000 CAPSULE ORAL
Qty: 12 CAPSULE | Refills: 0 | Status: SHIPPED | OUTPATIENT
Start: 2021-10-15 | End: 2022-11-23

## 2021-10-16 PROBLEM — F43.21 ADJUSTMENT DISORDER WITH DEPRESSED MOOD: Status: ACTIVE | Noted: 2021-10-16

## 2021-12-06 ENCOUNTER — OFFICE VISIT (OUTPATIENT)
Dept: FAMILY MEDICINE | Facility: CLINIC | Age: 36
End: 2021-12-06
Payer: COMMERCIAL

## 2021-12-06 VITALS
HEART RATE: 87 BPM | WEIGHT: 222.69 LBS | OXYGEN SATURATION: 97 % | SYSTOLIC BLOOD PRESSURE: 120 MMHG | DIASTOLIC BLOOD PRESSURE: 80 MMHG | BODY MASS INDEX: 35.79 KG/M2 | HEIGHT: 66 IN

## 2021-12-06 DIAGNOSIS — I10 ESSENTIAL HYPERTENSION: Primary | ICD-10-CM
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
## 2021-12-06 DIAGNOSIS — F43.21 ADJUSTMENT DISORDER WITH DEPRESSED MOOD: ICD-10-CM

## 2021-12-06 DIAGNOSIS — E66.01 CLASS 2 SEVERE OBESITY DUE TO EXCESS CALORIES WITH SERIOUS COMORBIDITY AND BODY MASS INDEX (BMI) OF 35.0 TO 35.9 IN ADULT: ICD-10-CM

## 2021-12-06 DIAGNOSIS — Z28.21 INFLUENZA VACCINATION DECLINED: ICD-10-CM

## 2021-12-06 PROCEDURE — 4010F PR ACE/ARB THEARPY RXD/TAKEN: ICD-10-PCS | Mod: CPTII,S$GLB,, | Performed by: FAMILY MEDICINE

## 2021-12-06 PROCEDURE — 99214 OFFICE O/P EST MOD 30 MIN: CPT | Mod: S$GLB,,, | Performed by: FAMILY MEDICINE

## 2021-12-06 PROCEDURE — 4010F ACE/ARB THERAPY RXD/TAKEN: CPT | Mod: CPTII,S$GLB,, | Performed by: FAMILY MEDICINE

## 2021-12-06 PROCEDURE — 99214 PR OFFICE/OUTPT VISIT, EST, LEVL IV, 30-39 MIN: ICD-10-PCS | Mod: S$GLB,,, | Performed by: FAMILY MEDICINE

## 2021-12-06 PROCEDURE — 99999 PR PBB SHADOW E&M-EST. PATIENT-LVL IV: ICD-10-PCS | Mod: PBBFAC,,, | Performed by: FAMILY MEDICINE

## 2021-12-06 PROCEDURE — 99999 PR PBB SHADOW E&M-EST. PATIENT-LVL IV: CPT | Mod: PBBFAC,,, | Performed by: FAMILY MEDICINE

## 2021-12-06 RX ORDER — LISINOPRIL 40 MG/1
40 TABLET ORAL DAILY
Qty: 90 TABLET | Refills: 3 | Status: SHIPPED | OUTPATIENT
Start: 2021-12-06 | End: 2022-08-10 | Stop reason: SDUPTHER

## 2021-12-06 RX ORDER — HYDROCHLOROTHIAZIDE 25 MG/1
TABLET ORAL
Qty: 90 TABLET | Refills: 3 | Status: SHIPPED | OUTPATIENT
Start: 2021-12-06 | End: 2022-08-10 | Stop reason: SDUPTHER

## 2022-02-03 ENCOUNTER — OFFICE VISIT (OUTPATIENT)
Dept: OPTOMETRY | Facility: CLINIC | Age: 37
End: 2022-02-03
Payer: COMMERCIAL

## 2022-02-03 DIAGNOSIS — B30.9 ACUTE VIRAL CONJUNCTIVITIS OF BOTH EYES: Primary | ICD-10-CM

## 2022-02-03 PROCEDURE — 92004 PR EYE EXAM, NEW PATIENT,COMPREHESV: ICD-10-PCS | Mod: S$GLB,,, | Performed by: OPTOMETRIST

## 2022-02-03 PROCEDURE — 1159F PR MEDICATION LIST DOCUMENTED IN MEDICAL RECORD: ICD-10-PCS | Mod: CPTII,S$GLB,, | Performed by: OPTOMETRIST

## 2022-02-03 PROCEDURE — 1159F MED LIST DOCD IN RCRD: CPT | Mod: CPTII,S$GLB,, | Performed by: OPTOMETRIST

## 2022-02-03 PROCEDURE — 99999 PR PBB SHADOW E&M-EST. PATIENT-LVL II: ICD-10-PCS | Mod: PBBFAC,,, | Performed by: OPTOMETRIST

## 2022-02-03 PROCEDURE — 99999 PR PBB SHADOW E&M-EST. PATIENT-LVL II: CPT | Mod: PBBFAC,,, | Performed by: OPTOMETRIST

## 2022-02-03 PROCEDURE — 92004 COMPRE OPH EXAM NEW PT 1/>: CPT | Mod: S$GLB,,, | Performed by: OPTOMETRIST

## 2022-02-03 RX ORDER — TOBRAMYCIN AND DEXAMETHASONE 3; 1 MG/ML; MG/ML
1 SUSPENSION/ DROPS OPHTHALMIC
Qty: 5 ML | Refills: 0 | Status: SHIPPED | OUTPATIENT
Start: 2022-02-03 | End: 2022-02-13

## 2022-02-03 NOTE — PROGRESS NOTES
HPI     Karuna is a 23 y.o female here today with complaints of red burning   irritated eyes for abt a months  she states it went away abt 2 weeks ago   but came back OS >OD   No glasses or contacts   Tried using otc drops havent helped   No sx hx     Last edited by Caterina Doe on 2/3/2022  9:40 AM. (History)            Assessment /Plan     For exam results, see Encounter Report.    Acute viral conjunctivitis of both eyes  -     tobramycin-dexamethasone 0.3-0.1% (TOBRADEX) 0.3-0.1 % DrpS; Place 1 drop into both eyes every 4 (four) hours while awake. for 10 days  Dispense: 1 each; Refill: 0  -Tobradex QID x 10 days   -Alternate PAtaday QD if unable to get to pharmacy      RTC PRN

## 2022-03-29 ENCOUNTER — PATIENT MESSAGE (OUTPATIENT)
Dept: FAMILY MEDICINE | Facility: CLINIC | Age: 37
End: 2022-03-29
Payer: COMMERCIAL

## 2022-03-29 NOTE — TELEPHONE ENCOUNTER
No new care gaps identified.  Powered by [employer]. Reference number: [id number].   3/29/2022 4:33:25 PM CDT

## 2022-03-30 RX ORDER — LEVOCETIRIZINE DIHYDROCHLORIDE 5 MG/1
5 TABLET, FILM COATED ORAL DAILY
Qty: 90 TABLET | Refills: 3 | Status: SHIPPED | OUTPATIENT
Start: 2022-03-30 | End: 2022-04-26 | Stop reason: SDUPTHER

## 2022-04-26 ENCOUNTER — PATIENT MESSAGE (OUTPATIENT)
Dept: FAMILY MEDICINE | Facility: CLINIC | Age: 37
End: 2022-04-26
Payer: COMMERCIAL

## 2022-04-26 RX ORDER — LEVOCETIRIZINE DIHYDROCHLORIDE 5 MG/1
5 TABLET, FILM COATED ORAL DAILY
Qty: 90 TABLET | Refills: 3 | Status: SHIPPED | OUTPATIENT
Start: 2022-04-26 | End: 2022-05-12 | Stop reason: SDUPTHER

## 2022-04-26 NOTE — TELEPHONE ENCOUNTER
Baby remains on RA. Color pink, No apparent distress noted. O2 sat limits %. O2 sat probe changed to L foot, cord on bottom of foot. No new care gaps identified.  Powered by [employer]. Reference number: [id number].   4/26/2022 9:18:04 AM CDT

## 2022-05-12 ENCOUNTER — OFFICE VISIT (OUTPATIENT)
Dept: FAMILY MEDICINE | Facility: CLINIC | Age: 37
End: 2022-05-12
Payer: COMMERCIAL

## 2022-05-12 VITALS
HEIGHT: 66 IN | HEART RATE: 95 BPM | OXYGEN SATURATION: 99 % | SYSTOLIC BLOOD PRESSURE: 132 MMHG | DIASTOLIC BLOOD PRESSURE: 84 MMHG | WEIGHT: 229.94 LBS | BODY MASS INDEX: 36.95 KG/M2

## 2022-05-12 DIAGNOSIS — J02.9 ALLERGIC PHARYNGITIS: ICD-10-CM

## 2022-05-12 DIAGNOSIS — M70.61 GREATER TROCHANTERIC BURSITIS OF RIGHT HIP: ICD-10-CM

## 2022-05-12 DIAGNOSIS — I10 ESSENTIAL HYPERTENSION: ICD-10-CM

## 2022-05-12 DIAGNOSIS — Z91.09 ENVIRONMENTAL ALLERGIES: Primary | ICD-10-CM

## 2022-05-12 PROCEDURE — 3008F PR BODY MASS INDEX (BMI) DOCUMENTED: ICD-10-PCS | Mod: CPTII,S$GLB,, | Performed by: FAMILY MEDICINE

## 2022-05-12 PROCEDURE — 3075F PR MOST RECENT SYSTOLIC BLOOD PRESS GE 130-139MM HG: ICD-10-PCS | Mod: CPTII,S$GLB,, | Performed by: FAMILY MEDICINE

## 2022-05-12 PROCEDURE — 3008F BODY MASS INDEX DOCD: CPT | Mod: CPTII,S$GLB,, | Performed by: FAMILY MEDICINE

## 2022-05-12 PROCEDURE — 1159F PR MEDICATION LIST DOCUMENTED IN MEDICAL RECORD: ICD-10-PCS | Mod: CPTII,S$GLB,, | Performed by: FAMILY MEDICINE

## 2022-05-12 PROCEDURE — 4010F PR ACE/ARB THEARPY RXD/TAKEN: ICD-10-PCS | Mod: CPTII,S$GLB,, | Performed by: FAMILY MEDICINE

## 2022-05-12 PROCEDURE — 99214 OFFICE O/P EST MOD 30 MIN: CPT | Mod: S$GLB,,, | Performed by: FAMILY MEDICINE

## 2022-05-12 PROCEDURE — 99999 PR PBB SHADOW E&M-EST. PATIENT-LVL IV: CPT | Mod: PBBFAC,,, | Performed by: FAMILY MEDICINE

## 2022-05-12 PROCEDURE — 99999 PR PBB SHADOW E&M-EST. PATIENT-LVL IV: ICD-10-PCS | Mod: PBBFAC,,, | Performed by: FAMILY MEDICINE

## 2022-05-12 PROCEDURE — 3079F DIAST BP 80-89 MM HG: CPT | Mod: CPTII,S$GLB,, | Performed by: FAMILY MEDICINE

## 2022-05-12 PROCEDURE — 99214 PR OFFICE/OUTPT VISIT, EST, LEVL IV, 30-39 MIN: ICD-10-PCS | Mod: S$GLB,,, | Performed by: FAMILY MEDICINE

## 2022-05-12 PROCEDURE — 3079F PR MOST RECENT DIASTOLIC BLOOD PRESSURE 80-89 MM HG: ICD-10-PCS | Mod: CPTII,S$GLB,, | Performed by: FAMILY MEDICINE

## 2022-05-12 PROCEDURE — 4010F ACE/ARB THERAPY RXD/TAKEN: CPT | Mod: CPTII,S$GLB,, | Performed by: FAMILY MEDICINE

## 2022-05-12 PROCEDURE — 1159F MED LIST DOCD IN RCRD: CPT | Mod: CPTII,S$GLB,, | Performed by: FAMILY MEDICINE

## 2022-05-12 PROCEDURE — 3075F SYST BP GE 130 - 139MM HG: CPT | Mod: CPTII,S$GLB,, | Performed by: FAMILY MEDICINE

## 2022-05-12 PROCEDURE — 1160F RVW MEDS BY RX/DR IN RCRD: CPT | Mod: CPTII,S$GLB,, | Performed by: FAMILY MEDICINE

## 2022-05-12 PROCEDURE — 1160F PR REVIEW ALL MEDS BY PRESCRIBER/CLIN PHARMACIST DOCUMENTED: ICD-10-PCS | Mod: CPTII,S$GLB,, | Performed by: FAMILY MEDICINE

## 2022-05-12 RX ORDER — LEVOCETIRIZINE DIHYDROCHLORIDE 5 MG/1
5 TABLET, FILM COATED ORAL DAILY
Qty: 90 TABLET | Refills: 3 | Status: SHIPPED | OUTPATIENT
Start: 2022-05-12 | End: 2022-10-13 | Stop reason: SDUPTHER

## 2022-05-12 RX ORDER — ALBUTEROL SULFATE 90 UG/1
AEROSOL, METERED RESPIRATORY (INHALATION)
COMMUNITY
Start: 2022-05-03 | End: 2022-10-13

## 2022-05-12 RX ORDER — DICLOFENAC SODIUM 75 MG/1
75 TABLET, DELAYED RELEASE ORAL 2 TIMES DAILY PRN
Qty: 60 TABLET | Refills: 1 | Status: SHIPPED | OUTPATIENT
Start: 2022-05-12 | End: 2022-10-13

## 2022-05-12 RX ORDER — METHOCARBAMOL 750 MG/1
1500 TABLET, FILM COATED ORAL 3 TIMES DAILY PRN
COMMUNITY
Start: 2021-12-06

## 2022-05-12 RX ORDER — ALBUTEROL SULFATE 0.83 MG/ML
SOLUTION RESPIRATORY (INHALATION)
COMMUNITY
Start: 2022-05-03 | End: 2022-10-13

## 2022-05-12 NOTE — LETTER
May 12, 2022      [facility]  [address]  [place] [address]  Phone: [phone]  Fax: [phone]       Patient: Karuna Sandoval   YOB: 1985  Date of Visit: 05/12/2022    To Whom It May Concern:    Dagoberto Sandoval  was at [facility] on 05/12/2022. The patient may return to work/school on 5/12/2022 with restrictions as listed below. If you have any questions or concerns, or if I can be of further assistance, please do not hesitate to contact me.    Please excuse absences 5/10-5/12.     Restrictions:  - Patient is taking a diuretic for blood pressure. Please allow water to be available throughout the shift, as well as bathroom breaks as  Needed. Patient has been advised to utilize thin, long clothing and hats as needed for heat and sun protection. Please allow additions under or over uniform.    - Patient currently has hip bursitis. Recovery will take 6-8 weeks. Prolonged sitting exacerbates symptoms and can lead to prolonged recovery time. Please allow patient 5 min every hour to move/stretch as needed to aide in recovery. If there is a possibility to accommodate a standing option at her work desk, please allow so.     Sincerely,                                     Blanca Last MD                                 Family Medicine/ Primary Care

## 2022-05-12 NOTE — PROGRESS NOTES
Subjective:       Patient ID: Karuna Sandoval is a 37 y.o. female.    Chief Complaint: Follow-up (Bronchitis follow up  )    Patient Active Problem List   Diagnosis    Thyromegaly    Essential hypertension    Microcytosis    Adjustment disorder with depressed mood      HPI  38 yo female presents with productive cough and wheezing for past 1.5 weeks. Flonase used. Antihistamine no longer covered with insurance.   No fever/chills, body aches. No current dyspnea but rhinitis, clear.     Reports also with right hip pain. Methocarbamol given - isn't helping per patient. Pain when still and sitting for prolonged periods of time or laying down. Does help to move. No trauma or falls.     Needs work notes with restrictions so she can walk regularly during work. On headset with response times tracked for calls at times.   Also reports that she is very heat intolerant. Working festivals throughout summer. States that prior doctor says that on diuretic, she cannot be out in the sun for long periods of time.     Review of Systems   All other systems reviewed and are negative.       Objective:     Vitals:    05/12/22 0904   BP: 132/84   Pulse: 95        Physical Exam  Vitals and nursing note reviewed.   Constitutional:       General: She is not in acute distress.     Appearance: Normal appearance. She is well-developed. She is not ill-appearing, toxic-appearing or diaphoretic.   HENT:      Head: Normocephalic and atraumatic.      Comments: TM: appear normal BL  Nasal congestion noted   Cobblestoning and pharyngeal erythema without exudate noted  No adenopathy  Full ROM of neck    Eyes:      General: No scleral icterus.     Conjunctiva/sclera: Conjunctivae normal.      Pupils: Pupils are equal, round, and reactive to light.   Cardiovascular:      Rate and Rhythm: Normal rate and regular rhythm.   Pulmonary:      Effort: Pulmonary effort is normal. No respiratory distress.      Breath sounds: Normal breath sounds.    Abdominal:      Palpations: Abdomen is soft.      Tenderness: There is no abdominal tenderness.   Musculoskeletal:      Cervical back: Normal range of motion and neck supple.      Comments: Pain over great trochanter. Limps with getting up.    Skin:     General: Skin is warm.      Coloration: Skin is not pale.      Findings: No rash.   Neurological:      Mental Status: She is alert and oriented to person, place, and time. Mental status is at baseline.   Psychiatric:         Attention and Perception: Attention and perception normal.         Mood and Affect: Mood and affect normal.         Speech: Speech normal.         Behavior: Behavior normal.         Thought Content: Thought content normal.         Cognition and Memory: Cognition and memory normal.         Judgment: Judgment normal.         Assessment:       1. Environmental allergies    2. Allergic pharyngitis    3. Greater trochanteric bursitis of right hip    4. Essential hypertension          Plan:         Environmental allergies  Allergic pharyngitis  -     levocetirizine (XYZAL) 5 MG tablet; Take 1 tablet (5 mg total) by mouth once daily at 6am.  Dispense: 90 tablet; Refill: 3  - reports stopped due to lack of coverage. Works better for her than other antihistamines. Restart. Good Rx coupon given.   - Continue Flonase as needed    Greater trochanteric bursitis of right hip  -     diclofenac (VOLTAREN) 75 MG EC tablet; Take 1 tablet (75 mg total) by mouth 2 (two) times daily as needed (hip pain and headache).  Dispense: 60 tablet; Refill: 1  - Exercises encouraged. Note provided.     Essential hypertension  -     CBC Auto Differential; Future; Expected date: 05/12/2022  -     Comprehensive Metabolic Panel; Future; Expected date: 05/12/2022  -     Hemoglobin A1C; Future; Expected date: 05/12/2022  - Doing ok, low Na diet and weight loss encouraged.   - Chronic health condition is stable and controlled. Continue current medication regimen and relevant  lifestyle modifications. Necessary medication refills addressed. Routine ongoing surveillance monitoring.     Patient's questions answered. Plan reviewed with patient at the end of visit. Relevant precautions to chief complaint and reasons to seek medical care or contact the office sooner reviewed with patient.     Follow up in about 5 months (around 10/12/2022) for Annual Exam (CBC, CMP, A1C).

## 2022-09-12 ENCOUNTER — PATIENT MESSAGE (OUTPATIENT)
Dept: FAMILY MEDICINE | Facility: CLINIC | Age: 37
End: 2022-09-12
Payer: COMMERCIAL

## 2022-09-12 ENCOUNTER — PATIENT MESSAGE (OUTPATIENT)
Dept: INTERNAL MEDICINE | Facility: CLINIC | Age: 37
End: 2022-09-12
Payer: COMMERCIAL

## 2022-09-12 DIAGNOSIS — I10 ESSENTIAL HYPERTENSION: ICD-10-CM

## 2022-09-12 RX ORDER — LISINOPRIL 40 MG/1
40 TABLET ORAL DAILY
Qty: 90 TABLET | Refills: 0 | Status: SHIPPED | OUTPATIENT
Start: 2022-09-12 | End: 2022-10-13 | Stop reason: SDUPTHER

## 2022-09-12 NOTE — TELEPHONE ENCOUNTER
No new care gaps identified.  [facility] Embedded Care Gaps. Reference number: [id number]. 9/12/2022   7:06:37 AM JOSÉ LUIST

## 2022-10-13 ENCOUNTER — OFFICE VISIT (OUTPATIENT)
Dept: FAMILY MEDICINE | Facility: CLINIC | Age: 37
End: 2022-10-13
Payer: COMMERCIAL

## 2022-10-13 ENCOUNTER — LAB VISIT (OUTPATIENT)
Dept: LAB | Facility: HOSPITAL | Age: 37
End: 2022-10-13
Attending: FAMILY MEDICINE
Payer: COMMERCIAL

## 2022-10-13 VITALS
BODY MASS INDEX: 37.84 KG/M2 | SYSTOLIC BLOOD PRESSURE: 108 MMHG | HEIGHT: 66 IN | WEIGHT: 235.44 LBS | OXYGEN SATURATION: 99 % | DIASTOLIC BLOOD PRESSURE: 82 MMHG | HEART RATE: 84 BPM

## 2022-10-13 DIAGNOSIS — E66.01 CLASS 2 SEVERE OBESITY DUE TO EXCESS CALORIES WITH SERIOUS COMORBIDITY AND BODY MASS INDEX (BMI) OF 38.0 TO 38.9 IN ADULT: ICD-10-CM

## 2022-10-13 DIAGNOSIS — E55.9 VITAMIN D DEFICIENCY: ICD-10-CM

## 2022-10-13 DIAGNOSIS — M25.561 BILATERAL CHRONIC KNEE PAIN: ICD-10-CM

## 2022-10-13 DIAGNOSIS — I10 ESSENTIAL HYPERTENSION: ICD-10-CM

## 2022-10-13 DIAGNOSIS — M25.562 BILATERAL CHRONIC KNEE PAIN: ICD-10-CM

## 2022-10-13 DIAGNOSIS — Z91.09 ENVIRONMENTAL ALLERGIES: ICD-10-CM

## 2022-10-13 DIAGNOSIS — G89.29 BILATERAL CHRONIC KNEE PAIN: ICD-10-CM

## 2022-10-13 DIAGNOSIS — F43.21 ADJUSTMENT DISORDER WITH DEPRESSED MOOD: Primary | ICD-10-CM

## 2022-10-13 DIAGNOSIS — F32.1 EPISODE OF MODERATE MAJOR DEPRESSION: ICD-10-CM

## 2022-10-13 LAB
25(OH)D3+25(OH)D2 SERPL-MCNC: 19 NG/ML (ref 30–96)
ALBUMIN SERPL BCP-MCNC: 4.3 G/DL (ref 3.5–5.2)
ALP SERPL-CCNC: 45 U/L (ref 55–135)
ALT SERPL W/O P-5'-P-CCNC: 29 U/L (ref 10–44)
ANION GAP SERPL CALC-SCNC: 7 MMOL/L (ref 8–16)
AST SERPL-CCNC: 23 U/L (ref 10–40)
BASOPHILS # BLD AUTO: 0.02 K/UL (ref 0–0.2)
BASOPHILS NFR BLD: 0.3 % (ref 0–1.9)
BILIRUB SERPL-MCNC: 0.5 MG/DL (ref 0.1–1)
BUN SERPL-MCNC: 13 MG/DL (ref 6–20)
CALCIUM SERPL-MCNC: 10.4 MG/DL (ref 8.7–10.5)
CHLORIDE SERPL-SCNC: 103 MMOL/L (ref 95–110)
CHOLEST SERPL-MCNC: 173 MG/DL (ref 120–199)
CHOLEST/HDLC SERPL: 4.6 {RATIO} (ref 2–5)
CO2 SERPL-SCNC: 30 MMOL/L (ref 23–29)
CREAT SERPL-MCNC: 1.2 MG/DL (ref 0.5–1.4)
DIFFERENTIAL METHOD: ABNORMAL
EOSINOPHIL # BLD AUTO: 0.1 K/UL (ref 0–0.5)
EOSINOPHIL NFR BLD: 1.2 % (ref 0–8)
ERYTHROCYTE [DISTWIDTH] IN BLOOD BY AUTOMATED COUNT: 16.6 % (ref 11.5–14.5)
EST. GFR  (NO RACE VARIABLE): 59.1 ML/MIN/1.73 M^2
ESTIMATED AVG GLUCOSE: 117 MG/DL (ref 68–131)
GLUCOSE SERPL-MCNC: 94 MG/DL (ref 70–110)
HBA1C MFR BLD: 5.7 % (ref 4–5.6)
HCT VFR BLD AUTO: 43 % (ref 37–48.5)
HDLC SERPL-MCNC: 38 MG/DL (ref 40–75)
HDLC SERPL: 22 % (ref 20–50)
HGB BLD-MCNC: 12.8 G/DL (ref 12–16)
IMM GRANULOCYTES # BLD AUTO: 0.02 K/UL (ref 0–0.04)
IMM GRANULOCYTES NFR BLD AUTO: 0.3 % (ref 0–0.5)
LDLC SERPL CALC-MCNC: 113.2 MG/DL (ref 63–159)
LYMPHOCYTES # BLD AUTO: 2.9 K/UL (ref 1–4.8)
LYMPHOCYTES NFR BLD: 41.5 % (ref 18–48)
MCH RBC QN AUTO: 22.6 PG (ref 27–31)
MCHC RBC AUTO-ENTMCNC: 29.8 G/DL (ref 32–36)
MCV RBC AUTO: 76 FL (ref 82–98)
MONOCYTES # BLD AUTO: 0.4 K/UL (ref 0.3–1)
MONOCYTES NFR BLD: 5.9 % (ref 4–15)
NEUTROPHILS # BLD AUTO: 3.5 K/UL (ref 1.8–7.7)
NEUTROPHILS NFR BLD: 50.8 % (ref 38–73)
NONHDLC SERPL-MCNC: 135 MG/DL
NRBC BLD-RTO: 0 /100 WBC
PLATELET # BLD AUTO: 356 K/UL (ref 150–450)
PMV BLD AUTO: 10.6 FL (ref 9.2–12.9)
POTASSIUM SERPL-SCNC: 4 MMOL/L (ref 3.5–5.1)
PROT SERPL-MCNC: 8.1 G/DL (ref 6–8.4)
RBC # BLD AUTO: 5.67 M/UL (ref 4–5.4)
SODIUM SERPL-SCNC: 140 MMOL/L (ref 136–145)
TRIGL SERPL-MCNC: 109 MG/DL (ref 30–150)
WBC # BLD AUTO: 6.91 K/UL (ref 3.9–12.7)

## 2022-10-13 PROCEDURE — 82306 VITAMIN D 25 HYDROXY: CPT | Performed by: NURSE PRACTITIONER

## 2022-10-13 PROCEDURE — 83036 HEMOGLOBIN GLYCOSYLATED A1C: CPT | Performed by: FAMILY MEDICINE

## 2022-10-13 PROCEDURE — 99214 PR OFFICE/OUTPT VISIT, EST, LEVL IV, 30-39 MIN: ICD-10-PCS | Mod: S$GLB,,, | Performed by: NURSE PRACTITIONER

## 2022-10-13 PROCEDURE — 80061 LIPID PANEL: CPT | Performed by: NURSE PRACTITIONER

## 2022-10-13 PROCEDURE — 1159F MED LIST DOCD IN RCRD: CPT | Mod: CPTII,S$GLB,, | Performed by: NURSE PRACTITIONER

## 2022-10-13 PROCEDURE — 3044F HG A1C LEVEL LT 7.0%: CPT | Mod: CPTII,S$GLB,, | Performed by: NURSE PRACTITIONER

## 2022-10-13 PROCEDURE — 4010F PR ACE/ARB THEARPY RXD/TAKEN: ICD-10-PCS | Mod: CPTII,S$GLB,, | Performed by: NURSE PRACTITIONER

## 2022-10-13 PROCEDURE — 85025 COMPLETE CBC W/AUTO DIFF WBC: CPT | Performed by: FAMILY MEDICINE

## 2022-10-13 PROCEDURE — 80053 COMPREHEN METABOLIC PANEL: CPT | Performed by: FAMILY MEDICINE

## 2022-10-13 PROCEDURE — 36415 COLL VENOUS BLD VENIPUNCTURE: CPT | Mod: PO | Performed by: NURSE PRACTITIONER

## 2022-10-13 PROCEDURE — 3079F PR MOST RECENT DIASTOLIC BLOOD PRESSURE 80-89 MM HG: ICD-10-PCS | Mod: CPTII,S$GLB,, | Performed by: NURSE PRACTITIONER

## 2022-10-13 PROCEDURE — 3079F DIAST BP 80-89 MM HG: CPT | Mod: CPTII,S$GLB,, | Performed by: NURSE PRACTITIONER

## 2022-10-13 PROCEDURE — 99999 PR PBB SHADOW E&M-EST. PATIENT-LVL IV: ICD-10-PCS | Mod: PBBFAC,,, | Performed by: NURSE PRACTITIONER

## 2022-10-13 PROCEDURE — 1159F PR MEDICATION LIST DOCUMENTED IN MEDICAL RECORD: ICD-10-PCS | Mod: CPTII,S$GLB,, | Performed by: NURSE PRACTITIONER

## 2022-10-13 PROCEDURE — 4010F ACE/ARB THERAPY RXD/TAKEN: CPT | Mod: CPTII,S$GLB,, | Performed by: NURSE PRACTITIONER

## 2022-10-13 PROCEDURE — 3074F SYST BP LT 130 MM HG: CPT | Mod: CPTII,S$GLB,, | Performed by: NURSE PRACTITIONER

## 2022-10-13 PROCEDURE — 99999 PR PBB SHADOW E&M-EST. PATIENT-LVL IV: CPT | Mod: PBBFAC,,, | Performed by: NURSE PRACTITIONER

## 2022-10-13 PROCEDURE — 3074F PR MOST RECENT SYSTOLIC BLOOD PRESSURE < 130 MM HG: ICD-10-PCS | Mod: CPTII,S$GLB,, | Performed by: NURSE PRACTITIONER

## 2022-10-13 PROCEDURE — 3044F PR MOST RECENT HEMOGLOBIN A1C LEVEL <7.0%: ICD-10-PCS | Mod: CPTII,S$GLB,, | Performed by: NURSE PRACTITIONER

## 2022-10-13 PROCEDURE — 99214 OFFICE O/P EST MOD 30 MIN: CPT | Mod: S$GLB,,, | Performed by: NURSE PRACTITIONER

## 2022-10-13 RX ORDER — FLUTICASONE PROPIONATE 50 UG/1
2 SPRAY, METERED NASAL 2 TIMES DAILY
Qty: 16 G | Refills: 1 | Status: SHIPPED | OUTPATIENT
Start: 2022-10-13

## 2022-10-13 RX ORDER — LISINOPRIL 40 MG/1
40 TABLET ORAL DAILY
Qty: 90 TABLET | Refills: 0 | Status: SHIPPED | OUTPATIENT
Start: 2022-10-13 | End: 2023-03-20 | Stop reason: SDUPTHER

## 2022-10-13 RX ORDER — ESCITALOPRAM OXALATE 10 MG/1
10 TABLET ORAL NIGHTLY
Qty: 30 TABLET | Refills: 1 | Status: SHIPPED | OUTPATIENT
Start: 2022-10-13 | End: 2022-11-23

## 2022-10-13 RX ORDER — ALBUTEROL SULFATE 0.83 MG/ML
2.5 SOLUTION RESPIRATORY (INHALATION) 4 TIMES DAILY PRN
Qty: 3 ML | Refills: 2 | Status: SHIPPED | OUTPATIENT
Start: 2022-10-13 | End: 2023-03-29

## 2022-10-13 RX ORDER — HYDROCHLOROTHIAZIDE 25 MG/1
TABLET ORAL
Qty: 90 TABLET | Refills: 0 | Status: SHIPPED | OUTPATIENT
Start: 2022-10-13 | End: 2023-02-06 | Stop reason: SDUPTHER

## 2022-10-13 RX ORDER — LEVOCETIRIZINE DIHYDROCHLORIDE 5 MG/1
5 TABLET, FILM COATED ORAL NIGHTLY
Qty: 90 TABLET | Refills: 3 | Status: SHIPPED | OUTPATIENT
Start: 2022-10-13 | End: 2023-01-03 | Stop reason: SDUPTHER

## 2022-10-17 ENCOUNTER — TELEPHONE (OUTPATIENT)
Dept: FAMILY MEDICINE | Facility: CLINIC | Age: 37
End: 2022-10-17
Payer: COMMERCIAL

## 2022-10-17 NOTE — TELEPHONE ENCOUNTER
Pt was informed of her tests results and had no questions or concerns regarding what was spoken to her. Pt verbalized understanding.

## 2022-10-17 NOTE — PROGRESS NOTES
Subjective:       Patient ID: Karuna Sandoval is a 37 y.o. female.    Chief Complaint: Follow-up and Allergies    This is a 38 yo female patient of Dr. Last who is new to me. She presents today for a follow-up on seasonal allergies and mood. PMH includes    Patient Active Problem List:     Thyromegaly     Essential hypertension     Microcytosis     Adjustment disorder with depressed mood    VSS today. Suffers with chronic rhinitis; taking antihistamine and using Flonase nasal spray; requesting refills at this time. Also suffers with chronic hip and knee pain; has tried diclofenac in the past that offers mild relief. Wishing to follow up with Orthopedics.    Feeling down lately, more than before. Continues to see therapist bi-monthly at [facility]. Dealing with different stressors including work stress and open to discussing treatment options. PHQ-9 score of 11 today showing moderate depression. Feeling tired/fatigue, having trouble sleeping. Denies SI/HI. Wants to request weekly sessions with therapist instead.     Review of Systems   HENT:  Positive for postnasal drip and rhinorrhea.    Musculoskeletal:  Positive for arthralgias (chronic).   Psychiatric/Behavioral:  Positive for dysphoric mood.      Otherwise negative  Objective:      Physical Exam  Vitals reviewed.   Constitutional:       General: She is not in acute distress.     Appearance: Normal appearance. She is well-developed and well-groomed. She is obese.   HENT:      Head: Normocephalic.      Right Ear: Tympanic membrane, ear canal and external ear normal.      Left Ear: Tympanic membrane, ear canal and external ear normal.   Eyes:      General:         Right eye: No discharge.         Left eye: No discharge.      Extraocular Movements: Extraocular movements intact.   Cardiovascular:      Rate and Rhythm: Normal rate and regular rhythm.      Heart sounds: No murmur heard.  Pulmonary:      Effort: Pulmonary effort is normal. No respiratory  distress.      Breath sounds: No wheezing or rhonchi.   Abdominal:      General: There is no distension.   Lymphadenopathy:      Cervical: No cervical adenopathy.   Skin:     General: Skin is warm and dry.      Coloration: Skin is not pale.   Neurological:      Mental Status: She is alert and oriented to person, place, and time.      Coordination: Coordination normal.      Gait: Gait normal.   Psychiatric:         Attention and Perception: Attention normal.         Mood and Affect: Mood and affect normal.         Speech: Speech normal.         Behavior: Behavior normal. Behavior is cooperative.         Thought Content: Thought content does not include homicidal or suicidal ideation. Thought content does not include homicidal or suicidal plan.       Assessment:       Problem List Items Addressed This Visit          Psychiatric    Adjustment disorder with depressed mood - Primary       Cardiac/Vascular    Essential hypertension    Relevant Medications    hydroCHLOROthiazide (HYDRODIURIL) 25 MG tablet    lisinopriL (PRINIVIL,ZESTRIL) 40 MG tablet    Other Relevant Orders    Lipid Panel (Completed)     Other Visit Diagnoses       Episode of moderate major depression        Relevant Medications    EScitalopram oxalate (LEXAPRO) 10 MG tablet    Bilateral chronic knee pain        Relevant Orders    Ambulatory referral/consult to Orthopedics    Environmental allergies        Relevant Medications    levocetirizine (XYZAL) 5 MG tablet    FLONASE ALLERGY RELIEF 50 mcg/actuation nasal spray    albuterol (PROVENTIL) 2.5 mg /3 mL (0.083 %) nebulizer solution    Vitamin D deficiency        Relevant Orders    Vitamin D (Completed)    Class 2 severe obesity due to excess calories with serious comorbidity and body mass index (BMI) of 38.0 to 38.9 in adult                  Plan:       Karuna was seen today for follow-up and allergies.    Diagnoses and all orders for this visit:    Adjustment disorder with depressed mood    Episode of  moderate major depression  -     EScitalopram oxalate (LEXAPRO) 10 MG tablet; Take 1 tablet (10 mg total) by mouth every evening. Take 1/2 tablet (5mg total) by mouth every evening for 7 days, then take 1 full tablet (10mg total) by mouth every evening thereafter    Essential hypertension  -     hydroCHLOROthiazide (HYDRODIURIL) 25 MG tablet; Take 1 tab po daily for high blood pressure  -     lisinopriL (PRINIVIL,ZESTRIL) 40 MG tablet; Take 1 tablet (40 mg total) by mouth once daily.  -     Lipid Panel; Future    Bilateral chronic knee pain  -     Ambulatory referral/consult to Orthopedics; Future    Environmental allergies  -     levocetirizine (XYZAL) 5 MG tablet; Take 1 tablet (5 mg total) by mouth every evening.  -     FLONASE ALLERGY RELIEF 50 mcg/actuation nasal spray; 2 sprays (100 mcg total) by Each Nostril route 2 (two) times daily.  -     albuterol (PROVENTIL) 2.5 mg /3 mL (0.083 %) nebulizer solution; Take 3 mLs (2.5 mg total) by nebulization 4 (four) times daily as needed for Wheezing or Shortness of Breath.    Vitamin D deficiency  -     Vitamin D; Future    Class 2 severe obesity due to excess calories with serious comorbidity and body mass index (BMI) of 38.0 to 38.9 in adult        - Begin taking Lexapro nightly as directed: 1/2 dose x 1 week, then full dose thereafter; discussed possible side effects  - Continue with counseling; d/t request weekly sessions  - Labs ordered today  - Encouraged patient to continue to eat a low salt/low fat diet and discussed importance of engaging in physical activity at least 5x/week for a minimum of 30 min/day  - Refills sent to preferred pharmacy  - Follow up with Orthopedics  - Will forward info to [place] dept  - Warning signs discussed  - Follow-up in 1 month virtually for mood follow-up, or sooner if needed          I spent a total of 30 minutes on the day of the visit.  This includes face to face time and non-face to face time preparing to see the patient (eg,  review of tests), obtaining and/or reviewing separately obtained history, documenting clinical information in the electronic or other health record, independently interpreting results and communicating results to the patient/family/caregiver, or care coordinator.

## 2022-10-17 NOTE — TELEPHONE ENCOUNTER
----- Message from Megha Maza NP sent at 10/14/2022  2:57 PM CDT -----  Vitamin D is low but improved from before; can take over the counter supplement daily--800-1000 units is acceptable  Normal cholesterol and triglyceride levels  A1C is in prediabetic range: try reducing intake of sweets and starchy foods like rice, bread, pasta and potatoes  Liver function is normal; kidney function slightly decreased--try drinking more water

## 2022-10-18 ENCOUNTER — PATIENT MESSAGE (OUTPATIENT)
Dept: FAMILY MEDICINE | Facility: CLINIC | Age: 37
End: 2022-10-18
Payer: COMMERCIAL

## 2022-10-20 ENCOUNTER — TELEPHONE (OUTPATIENT)
Dept: FAMILY MEDICINE | Facility: CLINIC | Age: 37
End: 2022-10-20
Payer: COMMERCIAL

## 2022-10-20 NOTE — TELEPHONE ENCOUNTER
Pt's FMLA forms were scanned into chart under  and faxed to Karuna and [place] [employer] [phone]. Confirmation was received. Pt notified.

## 2022-11-08 ENCOUNTER — TELEPHONE (OUTPATIENT)
Dept: ORTHOPEDICS | Facility: CLINIC | Age: 37
End: 2022-11-08
Payer: COMMERCIAL

## 2022-11-08 DIAGNOSIS — M25.562 BILATERAL CHRONIC KNEE PAIN: Primary | ICD-10-CM

## 2022-11-08 DIAGNOSIS — M25.561 BILATERAL CHRONIC KNEE PAIN: Primary | ICD-10-CM

## 2022-11-08 DIAGNOSIS — G89.29 BILATERAL CHRONIC KNEE PAIN: Primary | ICD-10-CM

## 2022-11-23 ENCOUNTER — TELEPHONE (OUTPATIENT)
Dept: FAMILY MEDICINE | Facility: CLINIC | Age: 37
End: 2022-11-23

## 2022-11-23 ENCOUNTER — OFFICE VISIT (OUTPATIENT)
Dept: FAMILY MEDICINE | Facility: CLINIC | Age: 37
End: 2022-11-23
Payer: COMMERCIAL

## 2022-11-23 DIAGNOSIS — R73.09 ABNORMAL GLUCOSE: ICD-10-CM

## 2022-11-23 DIAGNOSIS — F43.21 ADJUSTMENT DISORDER WITH DEPRESSED MOOD: Primary | ICD-10-CM

## 2022-11-23 DIAGNOSIS — R94.4 DECREASED GFR: ICD-10-CM

## 2022-11-23 DIAGNOSIS — R06.83 SNORING: ICD-10-CM

## 2022-11-23 PROCEDURE — 3044F PR MOST RECENT HEMOGLOBIN A1C LEVEL <7.0%: ICD-10-PCS | Mod: CPTII,95,, | Performed by: NURSE PRACTITIONER

## 2022-11-23 PROCEDURE — 99214 PR OFFICE/OUTPT VISIT, EST, LEVL IV, 30-39 MIN: ICD-10-PCS | Mod: 95,,, | Performed by: NURSE PRACTITIONER

## 2022-11-23 PROCEDURE — 4010F PR ACE/ARB THEARPY RXD/TAKEN: ICD-10-PCS | Mod: CPTII,95,, | Performed by: NURSE PRACTITIONER

## 2022-11-23 PROCEDURE — 1159F MED LIST DOCD IN RCRD: CPT | Mod: CPTII,95,, | Performed by: NURSE PRACTITIONER

## 2022-11-23 PROCEDURE — 99214 OFFICE O/P EST MOD 30 MIN: CPT | Mod: 95,,, | Performed by: NURSE PRACTITIONER

## 2022-11-23 PROCEDURE — 4010F ACE/ARB THERAPY RXD/TAKEN: CPT | Mod: CPTII,95,, | Performed by: NURSE PRACTITIONER

## 2022-11-23 PROCEDURE — 1160F PR REVIEW ALL MEDS BY PRESCRIBER/CLIN PHARMACIST DOCUMENTED: ICD-10-PCS | Mod: CPTII,95,, | Performed by: NURSE PRACTITIONER

## 2022-11-23 PROCEDURE — 1159F PR MEDICATION LIST DOCUMENTED IN MEDICAL RECORD: ICD-10-PCS | Mod: CPTII,95,, | Performed by: NURSE PRACTITIONER

## 2022-11-23 PROCEDURE — 1160F RVW MEDS BY RX/DR IN RCRD: CPT | Mod: CPTII,95,, | Performed by: NURSE PRACTITIONER

## 2022-11-23 PROCEDURE — 3044F HG A1C LEVEL LT 7.0%: CPT | Mod: CPTII,95,, | Performed by: NURSE PRACTITIONER

## 2022-11-23 NOTE — PROGRESS NOTES
"Subjective:       Patient ID: Karuna Sandoval is a 37 y.o. female.    Chief Complaint: Follow-up and Mood    The patient location is: [place]  The chief complaint leading to consultation is: mood follow-up  Visit type: audiovisual  Face to Face time with patient: 15 minutes  25 minutes of total time spent on the encounter, which includes face to face time and non-face to face time preparing to see the patient (eg, review of tests), Obtaining and/or reviewing separately obtained history, Documenting clinical information in the electronic or other health record, Independently interpreting results (not separately reported) and communicating results to the patient/family/caregiver, or Care coordination (not separately reported).     Each patient to whom he or she provides medical services by telemedicine is:  (1) informed of the relationship between the physician and patient and the respective role of any other health care provider with respect to management of the patient; and (2) notified that he or she may decline to receive medical services by telemedicine and may withdraw from such care at any time.    Notes: This is a pleasant 38 yo female patient of Dr. Last who is known to me. She presents today via [facility] Virtual visit for mood follow-up. PMH includes    Patient Active Problem List:     Thyromegaly     Essential hypertension     Microcytosis     Adjustment disorder with depressed mood    Patient was seen by me about a month ago and reported feeling down. Ordered to start Lexapro but did not start taking. Had visit with therapist shortly after our visit and mentioned that she may do well without it. Feels like she is doing better emotionally now than before. PHQ-9 score of 6 today showing mild depression, improved from previous visit. Was struggling with relationship issues and has now "accepted" what happened and able to cope with it. Continues to follow up with therapist. Has long-standing issues with " sleeping and fatigue d/t work (working 16 hour shifts). Denies SI/HI. Reports she has issues with sleeping; snores. Wishing to get this evaluated.     Review of Systems   Constitutional:  Negative for activity change and unexpected weight change.   HENT:  Negative for hearing loss, rhinorrhea and trouble swallowing.    Eyes:  Negative for discharge and visual disturbance.   Respiratory:  Negative for chest tightness and wheezing.    Cardiovascular:  Negative for chest pain and palpitations.   Gastrointestinal:  Negative for blood in stool, constipation, diarrhea and vomiting.   Endocrine: Negative for polydipsia and polyuria.   Genitourinary:  Negative for difficulty urinating, dysuria, hematuria and menstrual problem.   Musculoskeletal:  Negative for arthralgias, joint swelling and neck pain.   Neurological:  Negative for weakness and headaches.   Psychiatric/Behavioral:  Negative for confusion and dysphoric mood.        Objective:      Physical Exam  Constitutional:       General: She is not in acute distress.     Appearance: Normal appearance. She is well-developed and well-groomed.   HENT:      Head: Normocephalic.   Pulmonary:      Effort: Pulmonary effort is normal. No respiratory distress.   Skin:     Coloration: Skin is not pale.   Neurological:      Mental Status: She is alert and oriented to person, place, and time.   Psychiatric:         Attention and Perception: Attention normal.         Mood and Affect: Mood and affect normal.         Speech: Speech normal.         Behavior: Behavior normal. Behavior is cooperative.     Physical exam limited d/t virtual visit. Patient does not appear to be in any respiratory distress. Able to speak in complete sentences without becoming short of breath.  Assessment:       Problem List Items Addressed This Visit          Psychiatric    Adjustment disorder with depressed mood - Primary     Other Visit Diagnoses       Snoring        Relevant Orders    Ambulatory  referral/consult to Sleep Disorders    Decreased GFR        Relevant Orders    Hemoglobin A1C    BASIC METABOLIC PANEL    Abnormal glucose        Relevant Orders    Hemoglobin A1C              Plan:       Karuna was seen today for follow-up and mood.    Diagnoses and all orders for this visit:    Adjustment disorder with depressed mood    Snoring  -     Ambulatory referral/consult to Sleep Disorders; Future    Decreased GFR  -     Hemoglobin A1C; Future  -     BASIC METABOLIC PANEL; Future    Abnormal glucose  -     Hemoglobin A1C; Future        - Ok to hold off on Lexapro for now  - Continue to follow up with therapist  - Encouraged patient to eat a low salt/low fat ADA diet and discussed importance of engaging in physical activity at least 5x/week for a minimum of 30 min/day  - Follow up with Sleep Medicine  - RTC in 3 months or routine HTN follow-up with pre-labs, or sooner if needed          I spent a total of 30 minutes on the day of the visit.  This includes face to face time and non-face to face time preparing to see the patient (eg, review of tests), obtaining and/or reviewing separately obtained history, documenting clinical information in the electronic or other health record, independently interpreting results and communicating results to the patient/family/caregiver, or care coordinator.

## 2023-01-03 ENCOUNTER — PATIENT MESSAGE (OUTPATIENT)
Dept: FAMILY MEDICINE | Facility: CLINIC | Age: 38
End: 2023-01-03
Payer: COMMERCIAL

## 2023-01-25 DIAGNOSIS — Z12.31 OTHER SCREENING MAMMOGRAM: ICD-10-CM

## 2023-01-30 ENCOUNTER — PATIENT MESSAGE (OUTPATIENT)
Dept: ADMINISTRATIVE | Facility: HOSPITAL | Age: 38
End: 2023-01-30
Payer: COMMERCIAL

## 2023-02-20 ENCOUNTER — LAB VISIT (OUTPATIENT)
Dept: LAB | Facility: HOSPITAL | Age: 38
End: 2023-02-20
Attending: NURSE PRACTITIONER
Payer: COMMERCIAL

## 2023-02-20 DIAGNOSIS — R94.4 DECREASED GFR: ICD-10-CM

## 2023-02-20 DIAGNOSIS — R73.09 ABNORMAL GLUCOSE: ICD-10-CM

## 2023-02-20 LAB
ANION GAP SERPL CALC-SCNC: 10 MMOL/L (ref 8–16)
BUN SERPL-MCNC: 12 MG/DL (ref 6–20)
CALCIUM SERPL-MCNC: 10.3 MG/DL (ref 8.7–10.5)
CHLORIDE SERPL-SCNC: 103 MMOL/L (ref 95–110)
CO2 SERPL-SCNC: 27 MMOL/L (ref 23–29)
CREAT SERPL-MCNC: 1.2 MG/DL (ref 0.5–1.4)
EST. GFR  (NO RACE VARIABLE): 58.7 ML/MIN/1.73 M^2
ESTIMATED AVG GLUCOSE: 117 MG/DL (ref 68–131)
GLUCOSE SERPL-MCNC: 95 MG/DL (ref 70–110)
HBA1C MFR BLD: 5.7 % (ref 4–5.6)
POTASSIUM SERPL-SCNC: 4 MMOL/L (ref 3.5–5.1)
SODIUM SERPL-SCNC: 140 MMOL/L (ref 136–145)

## 2023-02-20 PROCEDURE — 36415 COLL VENOUS BLD VENIPUNCTURE: CPT | Mod: PO | Performed by: NURSE PRACTITIONER

## 2023-02-20 PROCEDURE — 80048 BASIC METABOLIC PNL TOTAL CA: CPT | Performed by: NURSE PRACTITIONER

## 2023-02-20 PROCEDURE — 83036 HEMOGLOBIN GLYCOSYLATED A1C: CPT | Performed by: NURSE PRACTITIONER

## 2023-03-22 ENCOUNTER — HOSPITAL ENCOUNTER (OUTPATIENT)
Dept: RADIOLOGY | Facility: HOSPITAL | Age: 38
Discharge: HOME | End: 2023-03-22
Attending: FAMILY MEDICINE
Payer: COMMERCIAL

## 2023-03-22 VITALS — HEIGHT: 66 IN | WEIGHT: 230 LBS | BODY MASS INDEX: 36.96 KG/M2

## 2023-03-22 DIAGNOSIS — Z12.31 OTHER SCREENING MAMMOGRAM: ICD-10-CM

## 2023-03-22 PROCEDURE — 77067 MAMMO DIGITAL SCREENING BILAT WITH TOMO: ICD-10-PCS | Mod: 26,,, | Performed by: RADIOLOGY

## 2023-03-22 PROCEDURE — 77063 BREAST TOMOSYNTHESIS BI: CPT | Mod: 26,,, | Performed by: RADIOLOGY

## 2023-03-22 PROCEDURE — 77063 MAMMO DIGITAL SCREENING BILAT WITH TOMO: ICD-10-PCS | Mod: 26,,, | Performed by: RADIOLOGY

## 2023-03-22 PROCEDURE — 77067 SCR MAMMO BI INCL CAD: CPT | Mod: 26,,, | Performed by: RADIOLOGY

## 2023-03-22 PROCEDURE — 77067 SCR MAMMO BI INCL CAD: CPT | Mod: TC

## 2023-03-29 ENCOUNTER — OFFICE VISIT (OUTPATIENT)
Dept: FAMILY MEDICINE | Facility: CLINIC | Age: 38
End: 2023-03-29
Payer: COMMERCIAL

## 2023-03-29 ENCOUNTER — PATIENT MESSAGE (OUTPATIENT)
Dept: FAMILY MEDICINE | Facility: CLINIC | Age: 38
End: 2023-03-29

## 2023-03-29 VITALS
SYSTOLIC BLOOD PRESSURE: 110 MMHG | DIASTOLIC BLOOD PRESSURE: 72 MMHG | HEIGHT: 66 IN | WEIGHT: 231.06 LBS | BODY MASS INDEX: 37.14 KG/M2 | HEART RATE: 85 BPM | OXYGEN SATURATION: 99 %

## 2023-03-29 DIAGNOSIS — L25.3 CONTACT DERMATITIS DUE TO OTHER CHEMICAL PRODUCT, UNSPECIFIED CONTACT DERMATITIS TYPE: ICD-10-CM

## 2023-03-29 DIAGNOSIS — R73.03 PREDIABETES: ICD-10-CM

## 2023-03-29 DIAGNOSIS — Z23 IMMUNIZATION DUE: ICD-10-CM

## 2023-03-29 DIAGNOSIS — F43.21 ADJUSTMENT DISORDER WITH DEPRESSED MOOD: ICD-10-CM

## 2023-03-29 DIAGNOSIS — N18.31 STAGE 3A CHRONIC KIDNEY DISEASE: ICD-10-CM

## 2023-03-29 DIAGNOSIS — E66.01 CLASS 2 SEVERE OBESITY DUE TO EXCESS CALORIES WITH SERIOUS COMORBIDITY AND BODY MASS INDEX (BMI) OF 37.0 TO 37.9 IN ADULT: ICD-10-CM

## 2023-03-29 DIAGNOSIS — I10 ESSENTIAL HYPERTENSION: ICD-10-CM

## 2023-03-29 DIAGNOSIS — Z12.31 ENCOUNTER FOR SCREENING MAMMOGRAM FOR MALIGNANT NEOPLASM OF BREAST: ICD-10-CM

## 2023-03-29 DIAGNOSIS — Z00.01 ENCOUNTER FOR GENERAL ADULT MEDICAL EXAMINATION WITH ABNORMAL FINDINGS: Primary | ICD-10-CM

## 2023-03-29 PROBLEM — E66.812 CLASS 2 SEVERE OBESITY DUE TO EXCESS CALORIES WITH SERIOUS COMORBIDITY AND BODY MASS INDEX (BMI) OF 37.0 TO 37.9 IN ADULT: Status: ACTIVE | Noted: 2023-03-29

## 2023-03-29 PROCEDURE — 90471 IMMUNIZATION ADMIN: CPT | Mod: S$GLB,,, | Performed by: FAMILY MEDICINE

## 2023-03-29 PROCEDURE — 1160F RVW MEDS BY RX/DR IN RCRD: CPT | Mod: CPTII,S$GLB,, | Performed by: FAMILY MEDICINE

## 2023-03-29 PROCEDURE — 99396 PREV VISIT EST AGE 40-64: CPT | Mod: 25,S$GLB,, | Performed by: FAMILY MEDICINE

## 2023-03-29 PROCEDURE — 1159F MED LIST DOCD IN RCRD: CPT | Mod: CPTII,S$GLB,, | Performed by: FAMILY MEDICINE

## 2023-03-29 PROCEDURE — 3078F PR MOST RECENT DIASTOLIC BLOOD PRESSURE < 80 MM HG: ICD-10-PCS | Mod: CPTII,S$GLB,, | Performed by: FAMILY MEDICINE

## 2023-03-29 PROCEDURE — 1160F PR REVIEW ALL MEDS BY PRESCRIBER/CLIN PHARMACIST DOCUMENTED: ICD-10-PCS | Mod: CPTII,S$GLB,, | Performed by: FAMILY MEDICINE

## 2023-03-29 PROCEDURE — 3008F BODY MASS INDEX DOCD: CPT | Mod: CPTII,S$GLB,, | Performed by: FAMILY MEDICINE

## 2023-03-29 PROCEDURE — 3078F DIAST BP <80 MM HG: CPT | Mod: CPTII,S$GLB,, | Performed by: FAMILY MEDICINE

## 2023-03-29 PROCEDURE — 3044F PR MOST RECENT HEMOGLOBIN A1C LEVEL <7.0%: ICD-10-PCS | Mod: CPTII,S$GLB,, | Performed by: FAMILY MEDICINE

## 2023-03-29 PROCEDURE — 99396 PR PREVENTIVE VISIT,EST,40-64: ICD-10-PCS | Mod: 25,S$GLB,, | Performed by: FAMILY MEDICINE

## 2023-03-29 PROCEDURE — 99999 PR PBB SHADOW E&M-EST. PATIENT-LVL IV: CPT | Mod: PBBFAC,,, | Performed by: FAMILY MEDICINE

## 2023-03-29 PROCEDURE — 4010F ACE/ARB THERAPY RXD/TAKEN: CPT | Mod: CPTII,S$GLB,, | Performed by: FAMILY MEDICINE

## 2023-03-29 PROCEDURE — 3074F PR MOST RECENT SYSTOLIC BLOOD PRESSURE < 130 MM HG: ICD-10-PCS | Mod: CPTII,S$GLB,, | Performed by: FAMILY MEDICINE

## 2023-03-29 PROCEDURE — 90715 TDAP VACCINE 7 YRS/> IM: CPT | Mod: S$GLB,,, | Performed by: FAMILY MEDICINE

## 2023-03-29 PROCEDURE — 4010F PR ACE/ARB THEARPY RXD/TAKEN: ICD-10-PCS | Mod: CPTII,S$GLB,, | Performed by: FAMILY MEDICINE

## 2023-03-29 PROCEDURE — 99999 PR PBB SHADOW E&M-EST. PATIENT-LVL IV: ICD-10-PCS | Mod: PBBFAC,,, | Performed by: FAMILY MEDICINE

## 2023-03-29 PROCEDURE — 1159F PR MEDICATION LIST DOCUMENTED IN MEDICAL RECORD: ICD-10-PCS | Mod: CPTII,S$GLB,, | Performed by: FAMILY MEDICINE

## 2023-03-29 PROCEDURE — 3008F PR BODY MASS INDEX (BMI) DOCUMENTED: ICD-10-PCS | Mod: CPTII,S$GLB,, | Performed by: FAMILY MEDICINE

## 2023-03-29 PROCEDURE — 90715 TDAP VACCINE GREATER THAN OR EQUAL TO 7YO IM: ICD-10-PCS | Mod: S$GLB,,, | Performed by: FAMILY MEDICINE

## 2023-03-29 PROCEDURE — 90471 TDAP VACCINE GREATER THAN OR EQUAL TO 7YO IM: ICD-10-PCS | Mod: S$GLB,,, | Performed by: FAMILY MEDICINE

## 2023-03-29 PROCEDURE — 3044F HG A1C LEVEL LT 7.0%: CPT | Mod: CPTII,S$GLB,, | Performed by: FAMILY MEDICINE

## 2023-03-29 PROCEDURE — 3074F SYST BP LT 130 MM HG: CPT | Mod: CPTII,S$GLB,, | Performed by: FAMILY MEDICINE

## 2023-03-29 RX ORDER — LISINOPRIL 40 MG/1
40 TABLET ORAL DAILY
Qty: 90 TABLET | Refills: 3 | Status: SHIPPED | OUTPATIENT
Start: 2023-03-29

## 2023-03-29 RX ORDER — TRIAMCINOLONE ACETONIDE 1 MG/G
CREAM TOPICAL 2 TIMES DAILY
Qty: 45 G | Refills: 0 | Status: SHIPPED | OUTPATIENT
Start: 2023-03-29

## 2023-03-29 RX ORDER — LORATADINE 10 MG/1
10 TABLET ORAL DAILY
Qty: 90 TABLET | Refills: 3 | Status: SHIPPED | OUTPATIENT
Start: 2023-03-29 | End: 2024-03-28

## 2023-03-29 RX ORDER — HYDROCHLOROTHIAZIDE 25 MG/1
TABLET ORAL
Qty: 90 TABLET | Refills: 3 | Status: SHIPPED | OUTPATIENT
Start: 2023-03-29

## 2023-03-29 NOTE — PROGRESS NOTES
Subjective:       Patient ID: Karuna Sandoval is a 38 y.o. female.    Chief Complaint: Hypertension    Patient Active Problem List   Diagnosis    Thyromegaly    Essential hypertension    Microcytosis    Adjustment disorder with depressed mood    Stage 3a chronic kidney disease    Class 2 severe obesity due to excess calories with serious comorbidity and body mass index (BMI) of 37.0 to 37.9 in adult      Hypertension  39 yo female presents for annual and HTN follow up. Working three jobs, has mother and son (18 yo) that she cares for.    3 months of increased rhinitis and occular allergy symptoms. Also with new rash - papular on forearms and chest that lasts for 2 days then resolved. No new known exposures.     Review of Systems   All other systems reviewed and are negative.       Results for orders placed or performed in visit on 02/20/23   Hemoglobin A1C   Result Value Ref Range    Hemoglobin A1C 5.7 (H) 4.0 - 5.6 %    Estimated Avg Glucose 117 68 - 131 mg/dL   BASIC METABOLIC PANEL   Result Value Ref Range    Sodium 140 136 - 145 mmol/L    Potassium 4.0 3.5 - 5.1 mmol/L    Chloride 103 95 - 110 mmol/L    CO2 27 23 - 29 mmol/L    Glucose 95 70 - 110 mg/dL    BUN 12 6 - 20 mg/dL    Creatinine 1.2 0.5 - 1.4 mg/dL    Calcium 10.3 8.7 - 10.5 mg/dL    Anion Gap 10 8 - 16 mmol/L    eGFR 58.7 (A) >60 mL/min/1.73 m^2     The 10-year ASCVD risk score (Marquise CALL, et al., 2019) is: 1.4%    Values used to calculate the score:      Age: 38 years      Sex: Female      Is Non- : Yes      Diabetic: No      Tobacco smoker: No      Systolic Blood Pressure: 110 mmHg      Is BP treated: Yes      HDL Cholesterol: 38 mg/dL      Total Cholesterol: 173 mg/dL    Objective:     Vitals:    03/29/23 1436   BP: 110/72   Pulse: 85        Physical Exam  Vitals and nursing note reviewed.   Constitutional:       General: She is not in acute distress.     Appearance: Normal appearance. She is obese. She is not  ill-appearing, toxic-appearing or diaphoretic.   HENT:      Head: Normocephalic and atraumatic.   Eyes:      General: No scleral icterus.     Conjunctiva/sclera: Conjunctivae normal.   Cardiovascular:      Rate and Rhythm: Normal rate.      Heart sounds: Normal heart sounds.   Pulmonary:      Effort: Pulmonary effort is normal. No respiratory distress.      Breath sounds: Normal breath sounds.   Abdominal:      General: Bowel sounds are normal.   Skin:     Coloration: Skin is not pale.   Neurological:      Mental Status: She is alert. Mental status is at baseline.   Psychiatric:         Attention and Perception: Attention and perception normal.         Mood and Affect: Mood and affect normal.         Speech: Speech normal.         Behavior: Behavior normal.         Cognition and Memory: Cognition and memory normal.         Judgment: Judgment normal.         Assessment:       1. Encounter for general adult medical examination with abnormal findings    2. Essential hypertension    3. Stage 3a chronic kidney disease    4. Adjustment disorder with depressed mood    5. Class 2 severe obesity due to excess calories with serious comorbidity and body mass index (BMI) of 37.0 to 37.9 in adult    6. Contact dermatitis due to other chemical product, unspecified contact dermatitis type    7. Prediabetes    8. Immunization due    9. Encounter for screening mammogram for malignant neoplasm of breast          Plan:         Encounter for general adult medical examination with abnormal findings  - Risk and age appropriate anticipatory guidance. HM reviewed and updated. Recommendations discussed with patient as appropriate.     Essential hypertension  -     hydroCHLOROthiazide (HYDRODIURIL) 25 MG tablet; Take 1 tab po daily for high blood pressure  Dispense: 90 tablet; Refill: 3  -     lisinopriL (PRINIVIL,ZESTRIL) 40 MG tablet; Take 1 tablet (40 mg total) by mouth once daily.  Dispense: 90 tablet; Refill: 3  -     Comprehensive  Metabolic Panel; Future; Expected date: 03/29/2023  - Chronic health condition is stable and controlled. Continue current medication regimen and relevant lifestyle modifications. Necessary medication refills addressed. Routine ongoing surveillance monitoring.     Stage 3a chronic kidney disease  -     Microalbumin/Creatinine Ratio, Urine; Future; Expected date: 03/29/2023  -     CBC Auto Differential; Future; Expected date: 03/29/2023  -     Comprehensive Metabolic Panel; Future; Expected date: 03/29/2023  -     TSH; Future; Expected date: 03/29/2023  - Chronic and stable, GFR 58    Adjustment disorder with depressed mood  - Ongoing, resources and self management strategies discussed    Class 2 severe obesity due to excess calories with serious comorbidity and body mass index (BMI) of 37.0 to 37.9 in adult  - Lifestyle modifications.     Contact dermatitis due to other chemical product, unspecified contact dermatitis type    Prediabetes  -     Hemoglobin A1C; Future; Expected date: 03/29/2023  -     TSH; Future; Expected date: 03/29/2023  - A1C stable at 5.7%. Lifestyle modifications. Consider Metformin    Immunization due  -     (In Office Administered) Tdap Vaccine    Encounter for screening mammogram for malignant neoplasm of breast  -     Mammo Digital Screening Bilat; Future; Expected date: 03/29/2023      Patient's questions answered. Plan reviewed with patient at the end of visit. Relevant precautions to chief complaint and reasons to seek medical care or contact the office sooner reviewed with patient.     Follow up in about 6 months (around 9/29/2023) for Hypertension Follow-up, (Prior to visit CBC, CMP, A1C, TSH, microalb).

## 2023-03-29 NOTE — PATIENT INSTRUCTIONS
Loratadine and Allegra daily. Triamcinolone cream (steroid cream) for areas where rash pops up.   Change detergent to hypoallergenic - Dreft, Tide free & Clear, Gain (hypoallergenic).  Scent-free body soap

## 2023-04-14 ENCOUNTER — PATIENT MESSAGE (OUTPATIENT)
Dept: RESEARCH | Facility: CLINIC | Age: 38
End: 2023-04-14
Payer: COMMERCIAL

## 2023-04-25 ENCOUNTER — DOCUMENTATION ONLY (OUTPATIENT)
Dept: RESEARCH | Facility: CLINIC | Age: 38
End: 2023-04-25
Payer: COMMERCIAL

## 2023-04-25 DIAGNOSIS — Z00.6 RESEARCH SUBJECT: Primary | ICD-10-CM

## 2023-04-25 NOTE — PROGRESS NOTES
PROPEL IT Weight Loss: Eligibility Confirmation  Remember to add Participant ID in Research Study  Age as of Today: 38 y.o.    Is patient age 40 to 70 years yes    Is patient race Black/: Epic: Black or      Primary care provider at [facility]: Blanca Last MD     Does the patient have an active [facility] portal account?  yes    Does the patient have prediabetes or Type 2 diabetes? yes  If yes, Based on: (Prediabetes) HbA1c 5.7- 6.4%    LAST HBA1C   Lab Results   Component Value Date    HGBA1C 5.7 (H) 02/20/2023    HGBA1C 5.7 (H) 10/13/2022    HGBA1C 5.4 10/11/2021          LAST BMI:   BMI Readings from Last 1 Encounters:   03/29/23 37.29 kg/m²      Was the patient's most recent BMI (within the past 12 months) between 30 and 50  yes    PCP REFERRAL  Did provider acknowledge or deny patient's participation? Need to Pend Order to PCP (after Consent)  _____________________________________________  LAST WEIGHT  (verify if this was an outpatient):   Wt Readings from Last 4 Encounters:   03/29/23 104.8 kg (231 lb 0.7 oz)   03/22/23 104.3 kg (230 lb)   10/13/22 106.8 kg (235 lb 7.2 oz)   05/12/22 104.3 kg (229 lb 15 oz)        PROPEL-IT Screening Date (enter from REDCap): 4/24/2023  Does the patient have a baseline clinic weight collected within 4 weeks (34 days) of Screening Date?  Yes, has baseline clinical wt                    When is the patient's next scheduled clinic appointment (potential wt)? 9/29/2023    Status Updated: 04/25/2023

## 2023-04-25 NOTE — PROGRESS NOTES
"PROPEL IT CONSENT DOCUMENTATION  Oncore Protocol 2022013: PROPEL IT  PROmoting Successful Weight Loss in Primary CarE in [place] (PROPEL) using Information Technology  : Stevan Demarco, PhD.  IRB of Record: [facility] ([facility]) ID# [facility] [id number]  [facility] Investigator: Sada Garcia MD      Informed Consent Process   Name of Study Team member Present for discussion: N/A   Prior to the Informed Consent being signed or any protocol required testing, procedure or intervention being performed, the following was completed or discussed:   Purpose of the study, qualifications to participate:  Study design, schedule and procedure:  Risks, benefits, alternative treatments, compensation and costs:  Confidentiality and HIPAA authorization for Release of Medical Records for the research trial/subjects right/study related injury:  Study related contact information:  Voluntary participation and withdrawal from the research trial at any time:  Patient has been offered the opportunity to ask questions regarding the study    and PI contact information given to subject:  Signed copy of consent form was provided to the subject via eMail:  Original consent or copy of electronic consent in subject's chart and uploaded in EPIC:        Karuna Sandoval electronically signed the informed consent form.     Was the consent done electronically: yes  Consent Signature Date (add to  note) ("Today's Date REDCap):  4/24/2023  The consent form as reviewed by Kerry Jessica  Name: ([facility] personnel reviewing consent form):  Gris Cisneros, Associate Clinical Research Coordinator   Is the potential subject currently enrolled in any other research trials (per Epic)? no  Participant ID (REDCap ID) added to Research Study   yes    Comments: See  for Consent Forms        I acknowledge that I have reviewed the informed consent form and " viewed the volunteer's electronically signed and dated the signature section of the consent forms.    yes

## 2023-04-26 ENCOUNTER — PATIENT MESSAGE (OUTPATIENT)
Dept: FAMILY MEDICINE | Facility: CLINIC | Age: 38
End: 2023-04-26
Payer: COMMERCIAL

## 2023-04-27 ENCOUNTER — RESEARCH ENCOUNTER (OUTPATIENT)
Dept: RESEARCH | Facility: CLINIC | Age: 38
End: 2023-04-27
Payer: COMMERCIAL

## 2023-04-27 DIAGNOSIS — Z00.6 RESEARCH SUBJECT: ICD-10-CM

## 2023-05-03 ENCOUNTER — TELEPHONE (OUTPATIENT)
Dept: RESEARCH | Facility: CLINIC | Age: 38
End: 2023-05-03
Payer: COMMERCIAL

## 2023-05-03 NOTE — TELEPHONE ENCOUNTER
CRC contacted MsSepideh Sandoval. Pt requested to be called back on Friday, 5/5/2023. Pt is off and can do the Onboarding call anytime.

## 2023-05-05 ENCOUNTER — TELEPHONE (OUTPATIENT)
Dept: RESEARCH | Facility: CLINIC | Age: 38
End: 2023-05-05
Payer: COMMERCIAL

## 2023-05-05 NOTE — TELEPHONE ENCOUNTER
"Scale Delivery Service Confirmation Date in REDCap: 4/28/2023  Address delivered To in [facility]: [address] [place] [id number]    Study Overview explained ([facility] , [facility] team, [facility] Research Coordinator) yes    Scale Received? YES    Pt has "first weights" to share: no    RECEIVED SCALE  ONLY  BodyTrace Scale Basic Tips reviewed (scale surface, morning, minimal clothing, not waterproof) yes    Pt requested assistance (to talk through) for the scale setup:   Yes AND scale worked properly    Email Reminders explained: Explain any No/NA  Lets take you first PROPEL weight containing link to enter weights Yes (please explain)     Check off "Call completed" on Intervention Onboarding form to trigger "first weight email"Yes     EVERYONE  Health  visit scheduled for  5/19/2023 at 8:00 am with Health  Tamika Carrasco.  Comments]: Unanswered questions: none  Remind pt to save the Health  number to their contacts: [phone]  ?   Post Call Follow-up: Call Completed      Reminders:   Research Studies: Add Branch B Intervention  Research Study Calendar: Edit Plan> Visit 1 Planned for Date >select date of 1st scheduled visit  Episodes of Care: PROPEL; PROPEL IT - ARM B  Care Teams: Add users, Admission notification, End Date (2y +1M)    "

## 2023-05-19 ENCOUNTER — PATIENT OUTREACH (OUTPATIENT)
Dept: RESEARCH | Facility: CLINIC | Age: 38
End: 2023-05-19
Payer: COMMERCIAL

## 2023-05-19 DIAGNOSIS — Z00.6 RESEARCH SUBJECT: ICD-10-CM

## 2023-05-19 NOTE — PROGRESS NOTES
05/19/2023  9:24 AM    Patient Name Karuna Sandoval   Appointment Department [facility] PROPBREONNA WEIGHT MANAGEMENT  Visit Type Audio (Virtual visit)    Clinic Weights   Wt Readings from Last 3 Encounters:   03/29/23 1436 104.8 kg (231 lb 0.7 oz)   03/22/23 0737 104.3 kg (230 lb)   10/13/22 1010 106.8 kg (235 lb 7.2 oz)     Last Reported Weights No data was found      [facility] INTERVENTION CONTACT:   Method of contact:  Phone Call   or Participant-Initiated Contact?:  -initiated contact  Type of intervention Contact:  Scheduled Session  Did the participant attend session?: Yes    Was the patient called within 15 minutes of the scheduled appointment?:  Yes  Is this a make-up session?: No    Which session materials covered in session?:  Session 1  Patient Reported Weight (From External Bita):  240  Patient-reported weekly minutes of physical activity::  0  Average Daily Steps: not reported.  Calorie Prescription::  2000  Safety Criteria Triggered:  None  Toolbox Triggered:  None  Weight Graph Stoplight Status for Dietary Adherence:  Green Light     Goals    None          Additional Notes:    Patient expressed her motivation for joining the study is to improve her overall health. Patient states that over the last 1.5-2 years her weight has increased and she is looking for a way to lose weight without lots of exercise. 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     Patient reports drinking mostly water and juice. Patient states she does not drink coffee except 1-2 times/month and does not drink soda.     Patient does not like the flavor of sugar free juice and discussed caloric intake from juice as a part of her diet. Patient also states that she eats out at restaurants frequently and is going to try to make better food choices there.     Patient is agreeable to the meal plan and utilizes grocery pickup.     Goals:   Place grocery pickup order and start following meal plan  Weigh once/day,  lula Carrasco M.Ed.  [facility]   [phone]

## 2023-05-26 ENCOUNTER — PATIENT OUTREACH (OUTPATIENT)
Dept: RESEARCH | Facility: CLINIC | Age: 38
End: 2023-05-26
Payer: COMMERCIAL

## 2023-05-26 NOTE — PROGRESS NOTES
05/26/2023  8:35 AM    Patient Name Karuna Sandoval   Appointment Department [facility] PROP WEIGHT MANAGEMENT  Visit Type Audio (Virtual visit)    Clinic Weights   Wt Readings from Last 3 Encounters:   03/29/23 1436 104.8 kg (231 lb 0.7 oz)   03/22/23 0737 104.3 kg (230 lb)   10/13/22 1010 106.8 kg (235 lb 7.2 oz)     Last Reported Weights No data was found      [facility] INTERVENTION CONTACT:   Method of contact:  Phone Call   or Participant-Initiated Contact?:  -initiated contact  Type of intervention Contact:  Scheduled Session  Did the participant attend session?: Yes    Was the patient called within 15 minutes of the scheduled appointment?:  Yes  Is this a make-up session?: No    Which session materials covered in session?:  Session 2  Patient Reported Weight (From External Bita):  235.7  Time workout: not reported.  Average Daily Steps: not reported.  Calorie Prescription::  2000  Safety Criteria Triggered:  None  Toolbox Triggered:  None  Weight Graph Stoplight Status for Dietary Adherence:  Green Light     Goals    None          Additional Notes:  Patient reports Doing Well. Patient is Highly Motivated with being 235.7 pounds today and aims to Get Below that weight next week. Patient is traveling to [place] next week for a vacation. Patient's plans for eating this week include Declining unhealthy options, portion control, and limiting her alcohol intake. For exercise, patient plans to walk around the town and beach where her AirBnB is.     Goals:   1) continue to follow the meal plan as best as possible using portion control  2) decline unhealthy drink options when possible and drink plenty of water    Tamika Carrasco M.Ed.  [facility]   [phone]

## 2023-06-05 ENCOUNTER — PATIENT OUTREACH (OUTPATIENT)
Dept: RESEARCH | Facility: CLINIC | Age: 38
End: 2023-06-05
Payer: COMMERCIAL

## 2023-06-05 NOTE — PROGRESS NOTES
06/05/2023  8:24 AM    Patient Name Karuna Sandoval   Appointment Department [facility] JOSE WEIGHT MANAGEMENT  Visit Type Audio (Virtual visit)    Clinic Weights   Wt Readings from Last 3 Encounters:   03/29/23 1436 104.8 kg (231 lb 0.7 oz)   03/22/23 0737 104.3 kg (230 lb)   10/13/22 1010 106.8 kg (235 lb 7.2 oz)     Last Reported Weights No data was found      [facility] INTERVENTION CONTACT:   Method of contact:  Phone Call   or Participant-Initiated Contact?:  -initiated contact  Type of intervention Contact:  Scheduled Session  Did the participant attend session?: Yes    Was the patient called within 15 minutes of the scheduled appointment?:  Yes  Is this a make-up session?: No    Which session materials covered in session?:  Session 3  Patient Reported Weight (From External Bita):  -6  Time workout: none reported.  Average Daily Steps: not reported.  Calorie Prescription::  2000  Safety Criteria Triggered:  None  Toolbox Triggered:  Self-Monitoring  Self monitoring::  Health  and participant to review self-monitoring strategies  Weight Graph Stoplight Status for Dietary Adherence:  Green Light       Goals    None          Additional Notes:    Patient reports that she is almost home from her vacation. Patient is highly motivated to start weighing herself again every day this week after being off for about 5-6 days. Patient's plans for eating this week include Buying pre-packaged meals from the grocery store (to prepare herself). For exercise, patient plans to workout at the park for at least 2 days.    Patient expressed some frustration with motivation to drink clear water. She explained that she will drink water at her desk, but does not drink water when not at work. Patient does have an aftertaste with sweetener alternatives. Patient does not drink sodas or carbonation (doesn't like the bubbles). She also rarely drinks coffee. Patient explained she also doesn't like fruit infused water. HC suggested  "cutting fruit juice with water to help dilute fruit juice and also flavor the water.     Patient also explained that she has been consistently eating salad kits with a side of protein prepared by herself. She would like to change up her routine a bit but does not eat frozen prepared entrees. Patient agrees to try a "meal kit" from the grocery store ([employer]) and understands to review calories per portion. Patient has been eating mostly chicken and fish (or shrimp). Patient does not eat red meat (very rarely) because it usually upsets her stomach.       Goals:   1) Resume weighing every day  2) Exercise 2 out of the 4 remaining weekdays      Tamika Carrasco M.Ed.  [facility]   [phone]       "

## 2023-06-09 ENCOUNTER — PATIENT OUTREACH (OUTPATIENT)
Dept: RESEARCH | Facility: CLINIC | Age: 38
End: 2023-06-09
Payer: COMMERCIAL

## 2023-06-09 NOTE — PROGRESS NOTES
06/09/2023  8:57 AM    Patient Name Karuna Sandoval   Appointment Department [facility] PROP WEIGHT MANAGEMENT  Visit Type Audio (Virtual visit)    Clinic Weights   Wt Readings from Last 3 Encounters:   03/29/23 1436 104.8 kg (231 lb 0.7 oz)   03/22/23 0737 104.3 kg (230 lb)   10/13/22 1010 106.8 kg (235 lb 7.2 oz)     Last Reported Weights No data was found      [facility] INTERVENTION CONTACT:   Method of contact:  Phone Call   or Participant-Initiated Contact?:  -initiated contact  Type of intervention Contact:  Scheduled Session  Did the participant attend session?: Yes    Was the patient called within 15 minutes of the scheduled appointment?:  Yes  Is this a make-up session?: No    Which session materials covered in session?:  Session 4  Patient Reported Weight (From External Bita):  235.2  Time workout: not reported.  Average Daily Steps: not reported.  Calorie Prescription::  2000  Safety Criteria Triggered:  None  Toolbox Triggered:  None  Weight Graph Stoplight Status for Dietary Adherence:  Green Light     Goals    None          Additional Notes:    Patient reports Not Doing Well following a stressful week and returning home from travel. Patient is 235.2 pounds today and aims to Get Below that weight next week. Patient's plans for eating this week include following the meal plan.     Patient explained that this week has been incredibly difficult for her following a breakup and is not open to setting any new goals today. HC encouraged patient to continue weighing daily and following the meal plan.      Tamika Carrasco M.Ed.  [facility]   [phone]

## 2023-06-16 ENCOUNTER — PATIENT OUTREACH (OUTPATIENT)
Dept: RESEARCH | Facility: CLINIC | Age: 38
End: 2023-06-16
Payer: COMMERCIAL

## 2023-06-16 NOTE — PROGRESS NOTES
06/16/2023  10:13 AM    Patient Name Karuna Sandoval   Appointment Department [facility] PROPEL WEIGHT MANAGEMENT  Visit Type Audio (Virtual visit)    Clinic Weights   Wt Readings from Last 3 Encounters:   03/29/23 1436 104.8 kg (231 lb 0.7 oz)   03/22/23 0737 104.3 kg (230 lb)   10/13/22 1010 106.8 kg (235 lb 7.2 oz)     Last Reported Weights No data was found      [facility] INTERVENTION CONTACT:   Method of contact:  Phone Call   or Participant-Initiated Contact?:  -initiated contact  Type of intervention Contact:  Scheduled Session  Did the participant attend session?: Yes    Was the patient called within 15 minutes of the scheduled appointment?:  Yes  Is this a make-up session?: No    Which session materials covered in session?:  Session 5  Patient Reported Weight (From External Bita):  232.1  Time workout: not reported.  Average Daily Steps: not reported.  Calorie Prescription::  2000  Safety Criteria Triggered:  None  Toolbox Triggered:  None  Weight Graph Stoplight Status for Dietary Adherence:  Green Light     Goals    None          Additional Notes:    Patient reports that she is doing better from last week when she was dealing with a break-up. Patient is motivated with being 232.1 pounds today and aims to Get Below that weight next week. Patient's plans for eating this week include following the meal plan and doing 16 hr time restrictive eating (eats between 11am - 7pm). For exercise, patient plans to go to the park or the gym at least once. Patient used to be very consistent with exercise, going to the park to run 2 miles and then going to lift weights at the gym. She does not have this much time anymore but wants to start making it a priority again.      Goals:   1) Continue to follow the meal plan and restrict eating between 11am - 7pm  2) Exercise at least one day after work, at the park or in the gym    Tamika Carrasco M.Ed.  [email or web address]   [phone]

## 2023-06-23 ENCOUNTER — PATIENT OUTREACH (OUTPATIENT)
Dept: RESEARCH | Facility: CLINIC | Age: 38
End: 2023-06-23
Payer: COMMERCIAL

## 2023-06-30 ENCOUNTER — PATIENT OUTREACH (OUTPATIENT)
Dept: RESEARCH | Facility: CLINIC | Age: 38
End: 2023-06-30
Payer: COMMERCIAL

## 2023-06-30 NOTE — PROGRESS NOTES
06/30/2023  8:04 AM    Patient Name Karuna Sandoval   Appointment Department [facility] PROP WEIGHT MANAGEMENT  Visit Type Audio (Virtual visit)    Clinic Weights   Wt Readings from Last 3 Encounters:   03/29/23 1436 104.8 kg (231 lb 0.7 oz)   03/22/23 0737 104.3 kg (230 lb)   10/13/22 1010 106.8 kg (235 lb 7.2 oz)     Last Reported Weights No data was found      [facility] INTERVENTION CONTACT:   Method of contact:  Phone Call   or Participant-Initiated Contact?:  -initiated contact  Type of intervention Contact:  Scheduled Session  Did the participant attend session?: Yes    Was the patient called within 15 minutes of the scheduled appointment?:  Yes  Is this a make-up session?: No    Which session materials covered in session?:  Session 7  Patient Reported Weight (From External Bita):  229  Time workout: not reported.  Average Daily Steps: not reported.  Calorie Prescription::  2000  Safety Criteria Triggered:  None  Toolbox Triggered:  None  Weight Graph Stoplight Status for Dietary Adherence:  Green Light     Goals    None          Additional Notes:    Patient reports Doing Well. Patient is somewhat motivated with being 229 pounds today and aims to Get Below that weight next week. Patient's plans for eating this week include following the meal plan. For exercise, patient plans to establish a routine of consistent weekly exercise. Patient struggles with scheduling exercise since she usually has obligations after work. 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 Patient enjoys jogging at the park after work and doing weight training exercise.     Patient states that she does not eat a lot of fruit as a part of her regular diet because it usually spoils before she can eat it.      Goal:   1) Start a regular routine of exercise      Tamika Carrasco M.Ed.  [facility]   [phone]

## 2023-07-07 ENCOUNTER — PATIENT OUTREACH (OUTPATIENT)
Dept: RESEARCH | Facility: CLINIC | Age: 38
End: 2023-07-07
Payer: COMMERCIAL

## 2023-07-07 NOTE — PROGRESS NOTES
07/07/2023  8:51 AM    Patient Name Karuna Sandoval   Appointment Department [facility] JOSE WEIGHT MANAGEMENT  Visit Type Audio (Virtual visit)    Clinic Weights   Wt Readings from Last 3 Encounters:   03/29/23 1436 104.8 kg (231 lb 0.7 oz)   03/22/23 0737 104.3 kg (230 lb)   10/13/22 1010 106.8 kg (235 lb 7.2 oz)     Last Reported Weights No data was found      [facility] INTERVENTION CONTACT:   Method of contact:  Phone Call   or Participant-Initiated Contact?:  -initiated contact  Type of intervention Contact:  Scheduled Session  Did the participant attend session?: Yes    Was the patient called within 15 minutes of the scheduled appointment?:  Yes  Is this a make-up session?: No    Which session materials covered in session?:  Session 8  Patient Reported Weight (From External Bita):  234  Time workout: not reported.  Average Daily Steps: not reported.  Calorie Prescription::  2000  Safety Criteria Triggered:  None  Toolbox Triggered:  Adherence to diet  Adherence to diet: Health  must choose at least two interventions from list::  Use of motivational interviewing and Modify eating behavior and meal patterns  Weight Graph Stoplight Status for Dietary Adherence:  Yellow Light - In the Zone     Goals    None          Additional Notes:    Patient reports Doing Well. Patient highly motivated to lose extra weight with being 234 pounds today and aims to Get Below that weight next week. Patient explained that she worked 10 hr days last weekend and then traveled to stay with her mother from Monday - Tuesday where she was eating off of her meal plan. Patient's plans for eating this week include following her meal plan and avoiding fast foods. For exercise, patient plans to workout at least 3 days with her son who has a mandatory PE class these next 6 weeks.     Patient states that the days she is missing weights from the last week were when she was out of town and when she weighed on 7/6, she was fully clothed  and had shoes on, so it is likely not an accurate weight.      Goals:   1) Establish a consistent exercise routine 3 days/week    Tamika Carrasco M.Ed.  [facility]   [phone]

## 2023-07-14 ENCOUNTER — PATIENT OUTREACH (OUTPATIENT)
Dept: RESEARCH | Facility: CLINIC | Age: 38
End: 2023-07-14
Payer: COMMERCIAL

## 2023-07-14 NOTE — PROGRESS NOTES
07/14/2023  8:19 AM    Patient Name Karuna Sandoval   Appointment Department [facility] PROPBREONNA WEIGHT MANAGEMENT  Visit Type Audio (Virtual visit)    Clinic Weights   Wt Readings from Last 3 Encounters:   03/29/23 1436 104.8 kg (231 lb 0.7 oz)   03/22/23 0737 104.3 kg (230 lb)   10/13/22 1010 106.8 kg (235 lb 7.2 oz)     Last Reported Weights No data was found      [facility] INTERVENTION CONTACT:   Method of contact:  Phone Call   or Participant-Initiated Contact?:  -initiated contact  Type of intervention Contact:  Scheduled Session  Did the participant attend session?: Yes    Was the patient called within 15 minutes of the scheduled appointment?:  Yes  Is this a make-up session?: No    Which session materials covered in session?:  Session 9  Patient Reported Weight (From External Bita):  232.6  Calorie Prescription::  2000  Safety Criteria Triggered:  None  Toolbox Triggered:  Adherence to diet  Weight Graph Stoplight Status for Dietary Adherence:  Yellow Light - In the Zone     Goals    None          Additional Notes:    Patient explained that she hurt her knee last Sunday and has been almost immobile since. Patient states that she is not able to exercise in the park like she used to (jogging/walking). Patient is pleased with her weight loss from last week despite the challenges she faced. Patient is somewhat motivated being 232.6 lbs this week.     Patient states that her emphasis this week is going to be on her diet since she is unable to exercise consistently. Patient is going to be cutting back on her carbohydrate intake and & eating more fruits and vegetables.     Patient also expressed that she would like to try different forms of exercise this week since she needs a low impact high out put activity. Patient agrees to try riding a bike & doing water exercises with her friend.       Goals:   1) Focus on diet: more fruits and vegetables, limited starchy carbohydrates  2) Try new exercises: riding a bike  and pool activities    Tamika Carrasco M.Ed.  [facility]   [phone]

## 2023-07-21 ENCOUNTER — PATIENT OUTREACH (OUTPATIENT)
Dept: RESEARCH | Facility: CLINIC | Age: 38
End: 2023-07-21
Payer: COMMERCIAL

## 2023-07-21 NOTE — PROGRESS NOTES
"07/21/2023  8:19 AM    Patient Name Karuna Sandoval   Appointment Department [facility] JOSE WEIGHT MANAGEMENT  Visit Type Audio (Virtual visit)    Clinic Weights   Wt Readings from Last 3 Encounters:   03/29/23 1436 104.8 kg (231 lb 0.7 oz)   03/22/23 0737 104.3 kg (230 lb)   10/13/22 1010 106.8 kg (235 lb 7.2 oz)     Last Reported Weights No data was found      [facility] INTERVENTION CONTACT:   Method of contact:  Phone Call   or Participant-Initiated Contact?:  -initiated contact  Type of intervention Contact:  Scheduled Session  Did the participant attend session?: Yes    Was the patient called within 15 minutes of the scheduled appointment?:  Yes  Is this a make-up session?: No    Which session materials covered in session?:  Session 10  Patient Reported Weight (From External Bita):  233  Patient-reported weekly minutes of physical activity::  0  Average Daily Steps: not reported.  Calorie Prescription::  2000  Safety Criteria Triggered:  None  Toolbox Triggered:  None  Weight Graph Stoplight Status for Dietary Adherence:  Green Light     Goals    None          Additional Notes:    Patient reports Doing Well. Patient is somewhat motivated with being 233 pounds today and aims to Get Below that weight next week. Patient's plans for eating this week include increasing her vegetable intake by eating at least one salad/day and drinking her "detox tea."  Patient is still unable to exercise following her knee injury.    Patient states that she is very motivated get below 230 lbs and believes that she can do this through dieting since she is not able to exercise as much as she could before.    Patient recalled an instance the last week where she drank fruit juice for the first time in a while. She remembered how much she likes fruit juice and admits that it was hard to stop drinking it once she had some. Patient acknowledges that she has to stay completely away from this as it does not support her weight loss. "     Patient       Goals:   1) At least one salad/day  2) Detox Tea - 30 minutes before meals    Tamika Carrasco M.Ed.  [facility]   [phone]

## 2023-07-27 ENCOUNTER — PATIENT MESSAGE (OUTPATIENT)
Dept: RESEARCH | Facility: CLINIC | Age: 38
End: 2023-07-27
Payer: COMMERCIAL

## 2023-07-28 ENCOUNTER — PATIENT OUTREACH (OUTPATIENT)
Dept: RESEARCH | Facility: CLINIC | Age: 38
End: 2023-07-28
Payer: COMMERCIAL

## 2023-07-28 NOTE — PROGRESS NOTES
07/28/2023  8:28 AM    Patient Name Karuna Sandoval   Appointment Department [facility] PROPEL WEIGHT MANAGEMENT  Visit Type Audio (Virtual visit)    Clinic Weights   Wt Readings from Last 3 Encounters:   03/29/23 1436 104.8 kg (231 lb 0.7 oz)   03/22/23 0737 104.3 kg (230 lb)   10/13/22 1010 106.8 kg (235 lb 7.2 oz)     Last Reported Weights No data was found      [facility] INTERVENTION CONTACT:   Method of contact:  Phone Call   or Participant-Initiated Contact?:  -initiated contact  Type of intervention Contact:  Scheduled Session  Did the participant attend session?: Yes    Was the patient called within 15 minutes of the scheduled appointment?:  Yes  Is this a make-up session?: No    Which session materials covered in session?:  Session 11  Patient Reported Weight (From External Bita):  226  Time workout: not reported.  Average Daily Steps: not reported.  Calorie Prescription::  2000  Safety Criteria Triggered:  None  Toolbox Triggered:  None  Weight Graph Stoplight Status for Dietary Adherence:  Green Light     Goals    None          Additional Notes:    Patient reports that she is very tired after a long week at work. Patient explained that her knee is feeling much better and she may not have to do the follow up appointment for it. Patient states that because of stress with work and a former relationship, she has not been eating enough this week and that is why her weight is down (so much). Patient admits that she barely ate one meal/day over the last week. Patient is looking forward to resting this weekend and having a better week overall next week.    Patient's plans for eating this week include eating more than once/day.     Goal:   1) Eat more than one meal/day     Tamika Carrasco M.Ed.  [facility]   [phone]

## 2023-08-04 ENCOUNTER — PATIENT OUTREACH (OUTPATIENT)
Dept: RESEARCH | Facility: CLINIC | Age: 38
End: 2023-08-04
Payer: COMMERCIAL

## 2023-08-04 NOTE — PROGRESS NOTES
08/04/2023  8:07 AM    Patient Name Karuna Sandoval   Appointment Department [facility] PROPEL WEIGHT MANAGEMENT  Visit Type Audio (Virtual visit)    Clinic Weights   Wt Readings from Last 3 Encounters:   03/29/23 1436 104.8 kg (231 lb 0.7 oz)   03/22/23 0737 104.3 kg (230 lb)   10/13/22 1010 106.8 kg (235 lb 7.2 oz)     Last Reported Weights No data was found      [facility] INTERVENTION CONTACT:   Method of contact:  Phone Call   or Participant-Initiated Contact?:  -initiated contact  Type of intervention Contact:  Scheduled Session  Did the participant attend session?: Yes    Was the patient called within 15 minutes of the scheduled appointment?:  Yes  Is this a make-up session?: No    Which session materials covered in session?:  Session 12  Patient Reported Weight (From External Bita):  225  Time workout: not reported.  Average Daily Steps: not reported.  Calorie Prescription::  2000  Safety Criteria Triggered:  None  Toolbox Triggered:  None  Weight Graph Stoplight Status for Dietary Adherence:  Green Light       Goals    None          Additional Notes:    Patient reports doing okay this week, better than last week but not 100% yet. Patient is somewhat motivated with being 225 pounds today and aims to Get Below that weight next week. Patient states that she is surprised she didn't lose more weight last week since she was still not eating much. Patient's plans for eating this week include prioritizing one balanced and healthy meal/day even when she doesn't feel like eating.     Goal:   1) Prioritize one healthy & balanced meal each day    Tamika Carrasco M.Ed.  [facility]   [phone]

## 2023-08-11 ENCOUNTER — PATIENT OUTREACH (OUTPATIENT)
Dept: RESEARCH | Facility: CLINIC | Age: 38
End: 2023-08-11
Payer: COMMERCIAL

## 2023-08-11 NOTE — PROGRESS NOTES
08/11/2023  8:11 AM    Patient Name Karuna Sandoval   Appointment Department [facility] PROPBREONNA WEIGHT MANAGEMENT  Visit Type Audio (Virtual visit)    Clinic Weights   Wt Readings from Last 3 Encounters:   03/29/23 1436 104.8 kg (231 lb 0.7 oz)   03/22/23 0737 104.3 kg (230 lb)   10/13/22 1010 106.8 kg (235 lb 7.2 oz)     Last Reported Weights No data was found      [facility] INTERVENTION CONTACT:   Method of contact:  Phone Call   or Participant-Initiated Contact?:  -initiated contact  Type of intervention Contact:  Scheduled Session  Did the participant attend session?: Yes    Was the patient called within 15 minutes of the scheduled appointment?:  Yes  Is this a make-up session?: No    Which session materials covered in session?:  Session 13  Patient Reported Weight (From External Bita):  225  Time workout: not reported.  Average Daily Steps: not reported.  Calorie Prescription::  2000  Safety Criteria Triggered:  None  Toolbox Triggered:  None  Weight Graph Stoplight Status for Dietary Adherence:  Green Light       Goals    None          Additional Notes:    Patient reports doing better this week than the last week or so. Patient is motivated  with being 225 pounds today and aims to get below that weight next week. Patient's plans for eating this week include prioritizing her lunch meal. For exercise, patient plans to rejoin her gym since she knows that she will have to start exercising in the evening after dark.      Goal:   1) Prioritizing lunch meal to be a complete and balanced meal    Tamika Carrasco

## 2023-08-18 ENCOUNTER — PATIENT OUTREACH (OUTPATIENT)
Dept: RESEARCH | Facility: CLINIC | Age: 38
End: 2023-08-18
Payer: COMMERCIAL

## 2023-08-18 NOTE — PROGRESS NOTES
"08/18/2023  8:31 AM    Patient Name Karuna Sandoval   Appointment Department [facility] JOSE WEIGHT MANAGEMENT  Visit Type Audio (Virtual visit)    Clinic Weights   Wt Readings from Last 3 Encounters:   03/29/23 1436 104.8 kg (231 lb 0.7 oz)   03/22/23 0737 104.3 kg (230 lb)   10/13/22 1010 106.8 kg (235 lb 7.2 oz)     Last Reported Weights No data was found      [facility] INTERVENTION CONTACT:   Method of contact:  Phone Call   or Participant-Initiated Contact?:  -initiated contact  Type of intervention Contact:  Scheduled Session  Did the participant attend session?: Yes    Was the patient called within 15 minutes of the scheduled appointment?:  Yes  Is this a make-up session?: No    Which session materials covered in session?:  Session 14  Patient Reported Weight (From External Bita):  223  Time workout: not reported.  Average Daily Steps: not reported.  Calorie Prescription::  2000  Safety Criteria Triggered:  None  Toolbox Triggered:  None  Weight Graph Stoplight Status for Dietary Adherence:  Green Light       Goals    None          Additional Notes:    Patient reports Doing Well. Patient states that she is starting to feel much better & dealing with the "drama" in her life at the moment. Patient states that therapy is helping her with this. Patient is Highly Motivated with being 223 pounds today and aims to Get Below that weight next week. Patient's plans for eating this week include continuing to make healthy choices. For exercise, patient plans to workout with her friend twice.    Patient states that she is highly motivated with how how she notices her clothes fitting better and "less rolls." Patient is more motivated now with body recomposition (gaining muscle mass and losing fat) than just "weight loss."     Goal:   1) Exercise twice w/friend for accountability      "

## 2023-08-25 ENCOUNTER — PATIENT OUTREACH (OUTPATIENT)
Dept: RESEARCH | Facility: CLINIC | Age: 38
End: 2023-08-25
Payer: COMMERCIAL

## 2023-08-25 NOTE — PROGRESS NOTES
08/25/2023  8:06 AM    Patient Name Karuna Sandoval   Appointment Department [facility] PROP WEIGHT MANAGEMENT  Visit Type Audio (Virtual visit)    Clinic Weights   Wt Readings from Last 3 Encounters:   03/29/23 1436 104.8 kg (231 lb 0.7 oz)   03/22/23 0737 104.3 kg (230 lb)   10/13/22 1010 106.8 kg (235 lb 7.2 oz)     Last Reported Weights No data was found      [facility] INTERVENTION CONTACT:   Method of contact:  Phone Call   or Participant-Initiated Contact?:  -initiated contact  Type of intervention Contact:  Scheduled Session  Did the participant attend session?: Yes    Was the patient called within 15 minutes of the scheduled appointment?:  Yes  Is this a make-up session?: No    Which session materials covered in session?:  Session 15  Patient Reported Weight (From External Bita):  225  Patient-reported weekly minutes of physical activity::  60  Average Daily Steps: not reported.  Calorie Prescription::  2000  Safety Criteria Triggered:  None  Toolbox Triggered:  None  Weight Graph Stoplight Status for Dietary Adherence:  Green Light       Goals    None          Additional Notes:    Patient reports Doing Well. Patient is somewhat motivated with being 225 pounds today and aims to Get Below that weight next week. Patient's plans for eating this week include following her 2,000 calorie diet. For exercise, patient plans to workout at the gym 3 days, ideally before bedtime. Patient did exercise with her friend once last week, but there was an emergency with her friend and was unable to meet for a second time.     Patient's goal right now is to slim-down and lose fat before she refocuses on toning and body recomposition.    Goal:   1) Exercise at gym 3 days/week, ideally before bedtime/evening      Tamika Carrasco M.Ed.  [facility]   [phone]

## 2023-09-01 ENCOUNTER — PATIENT OUTREACH (OUTPATIENT)
Dept: RESEARCH | Facility: CLINIC | Age: 38
End: 2023-09-01
Payer: COMMERCIAL

## 2023-09-01 NOTE — PROGRESS NOTES
09/01/2023  8:13 AM    Patient Name Karuna Sandoval   Appointment Department [facility] PROP WEIGHT MANAGEMENT  Visit Type Audio (Virtual visit)    Clinic Weights   Wt Readings from Last 3 Encounters:   03/29/23 1436 104.8 kg (231 lb 0.7 oz)   03/22/23 0737 104.3 kg (230 lb)   10/13/22 1010 106.8 kg (235 lb 7.2 oz)     Last Reported Weights No data was found      [facility] INTERVENTION CONTACT:   Method of contact:  Phone Call   or Participant-Initiated Contact?:  -initiated contact  Type of intervention Contact:  Scheduled Session  Did the participant attend session?: Yes    Was the patient called within 15 minutes of the scheduled appointment?:  Yes  Is this a make-up session?: No    Which session materials covered in session?:  Session 16  Patient Reported Weight (From External Bita):  222.4  Time workout: not reported.  Average Daily Steps: not reported.  Calorie Prescription::  2000  Safety Criteria Triggered:  None  Toolbox Triggered:  None  Weight Graph Stoplight Status for Dietary Adherence:  Green Light       Goals    None          Additional Notes:    Patient reports doing okay today. She is still working through a very difficult break-up. Patient is pleased with being 222.4 pounds today and aims to Get Below that weight next week. Patient's plans for eating this week include following her 2000 calorie meal plan. At this time, patient wants to exercise but is not motivated because of the sadness of her break-up.     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 Patient truly enjoys exercising but at this time does not have the motivation to get up and go.     Encouraged patient to find time for things she enjoys (like exercising) in an effort to do some self-care during this difficult time.     Goal:   1) Exercise, if able, on Labor Day - take time to do things you enjoy!    Tamika Carrasco M.Ed.  [facility]    [phone]        Tamika Carrasco

## 2023-09-08 ENCOUNTER — PATIENT OUTREACH (OUTPATIENT)
Dept: RESEARCH | Facility: CLINIC | Age: 38
End: 2023-09-08
Payer: COMMERCIAL

## 2023-09-08 NOTE — PROGRESS NOTES
09/08/2023  8:17 AM    Patient Name Karuna Sandoval   Appointment Department [facility] PROP WEIGHT MANAGEMENT  Visit Type Audio (Virtual visit)    Clinic Weights   Wt Readings from Last 3 Encounters:   03/29/23 1436 104.8 kg (231 lb 0.7 oz)   03/22/23 0737 104.3 kg (230 lb)   10/13/22 1010 106.8 kg (235 lb 7.2 oz)     Last Reported Weights No data was found      [facility] INTERVENTION CONTACT:   Method of contact:  Phone Call   or Participant-Initiated Contact?:  -initiated contact  Type of intervention Contact:  Scheduled Session  Did the participant attend session?: Yes    Was the patient called within 15 minutes of the scheduled appointment?:  Yes  Is this a make-up session?: No    Which session materials covered in session?:  Session 17  Patient Reported Weight (From External Bita):  219.6  Time workout: not specified.  Average Daily Steps: not reported.  Calorie Prescription::  2000  Safety Criteria Triggered:  None  Toolbox Triggered:  None  Weight Graph Stoplight Status for Dietary Adherence:  Green Light       Goals    None          Additional Notes:    Patient reports doing better today (than last week). She explained that things in her personal life are improving by blocking her toxic ex & attending therapy. She also explained that she is going to start journaling.     Patient is Highly Motivated with being 219 pounds today and aims to Get Below that weight next week. Patient's plans for eating this week include following her 2000 calorie meal plan. For exercise, patient plans to walk this weekend while she is working.    Goal:   1) Getting back to a peaceful place & eliminating distractors from that goal    Tamika Carrasco M.Ed.  [facility]   [phone]

## 2023-09-15 ENCOUNTER — PATIENT OUTREACH (OUTPATIENT)
Dept: RESEARCH | Facility: CLINIC | Age: 38
End: 2023-09-15
Payer: COMMERCIAL

## 2023-09-15 NOTE — PROGRESS NOTES
09/15/2023  8:23 AM    Patient Name Karuna Sandoval   Appointment Department [facility] PROPEL WEIGHT MANAGEMENT  Visit Type Audio (Virtual visit)    Clinic Weights   Wt Readings from Last 3 Encounters:   03/29/23 1436 104.8 kg (231 lb 0.7 oz)   03/22/23 0737 104.3 kg (230 lb)   10/13/22 1010 106.8 kg (235 lb 7.2 oz)     Last Reported Weights No data was found      [facility] INTERVENTION CONTACT:   Method of contact:  Phone Call   or Participant-Initiated Contact?:  -initiated contact  Type of intervention Contact:  Scheduled Session  Did the participant attend session?: Yes    Was the patient called within 15 minutes of the scheduled appointment?:  Yes  Is this a make-up session?: No    Which session materials covered in session?:  Session 18  Patient Reported Weight (From External Bita):  219  Time workout: not reported.  Average Daily Steps: not reported.  Calorie Prescription::  2000  Safety Criteria Triggered:  None  Toolbox Triggered:  None  Weight Graph Stoplight Status for Dietary Adherence:  Green Light       Goals    None          Additional Notes:    Patient reports doing well today and is steadily losing weight. She is working through a painful and difficult breakup right now so her priority is on herself & getting to a place of peace. Weight loss has been a side effect of this process and she loves exercise but has not been exercising due to circumstances (schedule/weather).     Goals:   1) Meditation Bita - trying 5 min sessions   2) Continue Journaling      Tamika Carrasco M.Ed.  [facility]   [phone]

## 2023-09-22 ENCOUNTER — PATIENT OUTREACH (OUTPATIENT)
Dept: RESEARCH | Facility: CLINIC | Age: 38
End: 2023-09-22
Payer: COMMERCIAL

## 2023-09-22 NOTE — PROGRESS NOTES
09/22/2023  8:16 AM    Patient Name Karuna Sandoval   Appointment Department [facility] PROP WEIGHT MANAGEMENT  Visit Type Audio (Virtual visit)    Clinic Weights   Wt Readings from Last 3 Encounters:   03/29/23 1436 104.8 kg (231 lb 0.7 oz)   03/22/23 0737 104.3 kg (230 lb)   10/13/22 1010 106.8 kg (235 lb 7.2 oz)     Last Reported Weights No data was found      [facility] INTERVENTION CONTACT:   Method of contact:  Phone Call   or Participant-Initiated Contact?:  -initiated contact  Type of intervention Contact:  Scheduled Session  Did the participant attend session?: Yes    Was the patient called within 15 minutes of the scheduled appointment?:  Yes  Is this a make-up session?: No    Which session materials covered in session?:  Session 19  Patient Reported Weight (From External Bita):  216  Time workout: not reported.  Average Daily Steps: not reported.  Calorie Prescription::  2000  Safety Criteria Triggered:  None  Toolbox Triggered:  None  Weight Graph Stoplight Status for Dietary Adherence:  Green Light       Goals    None          Additional Notes:    Patient reports Doing Well. Patient is in much higher spirits today compared to two weeks ago. She states that it has been a busy/hectic week but that she is taking everything day-by day and keeping herself first. She admits that meditation is not her strength but she does enjoy the journaling she has been doing and helps her regulate her emotions/stress before bed.     Patient is (honestly) confused with being 216 pounds today. She explained that she feels like she is eating enough and is not hungry. She also isn't exercising so does not know how and why she is losing weight steadily.     Goals:   - Have a fun weekend visit with friend in town!  - Re-joining the gym for beginning of October      Tamika Carrasco M.Ed.  [facility]   [phone]

## 2023-09-26 ENCOUNTER — LAB VISIT (OUTPATIENT)
Dept: LAB | Facility: HOSPITAL | Age: 38
End: 2023-09-26
Attending: FAMILY MEDICINE
Payer: COMMERCIAL

## 2023-09-26 DIAGNOSIS — N18.31 STAGE 3A CHRONIC KIDNEY DISEASE: ICD-10-CM

## 2023-09-26 DIAGNOSIS — R73.03 PREDIABETES: ICD-10-CM

## 2023-09-26 DIAGNOSIS — I10 ESSENTIAL HYPERTENSION: ICD-10-CM

## 2023-09-26 LAB
ALBUMIN SERPL BCP-MCNC: 4.3 G/DL (ref 3.5–5.2)
ALP SERPL-CCNC: 40 U/L (ref 55–135)
ALT SERPL W/O P-5'-P-CCNC: 16 U/L (ref 10–44)
ANION GAP SERPL CALC-SCNC: 10 MMOL/L (ref 8–16)
AST SERPL-CCNC: 18 U/L (ref 10–40)
BASOPHILS # BLD AUTO: 0.02 K/UL (ref 0–0.2)
BASOPHILS NFR BLD: 0.3 % (ref 0–1.9)
BILIRUB SERPL-MCNC: 0.4 MG/DL (ref 0.1–1)
BUN SERPL-MCNC: 16 MG/DL (ref 6–20)
CALCIUM SERPL-MCNC: 9.8 MG/DL (ref 8.7–10.5)
CHLORIDE SERPL-SCNC: 101 MMOL/L (ref 95–110)
CO2 SERPL-SCNC: 27 MMOL/L (ref 23–29)
CREAT SERPL-MCNC: 1.3 MG/DL (ref 0.5–1.4)
DIFFERENTIAL METHOD: ABNORMAL
EOSINOPHIL # BLD AUTO: 0.1 K/UL (ref 0–0.5)
EOSINOPHIL NFR BLD: 1.1 % (ref 0–8)
ERYTHROCYTE [DISTWIDTH] IN BLOOD BY AUTOMATED COUNT: 15.8 % (ref 11.5–14.5)
EST. GFR  (NO RACE VARIABLE): 53.3 ML/MIN/1.73 M^2
ESTIMATED AVG GLUCOSE: 114 MG/DL (ref 68–131)
GLUCOSE SERPL-MCNC: 98 MG/DL (ref 70–110)
HBA1C MFR BLD: 5.6 % (ref 4–5.6)
HCT VFR BLD AUTO: 38.3 % (ref 37–48.5)
HGB BLD-MCNC: 11.7 G/DL (ref 12–16)
IMM GRANULOCYTES # BLD AUTO: 0.01 K/UL (ref 0–0.04)
IMM GRANULOCYTES NFR BLD AUTO: 0.1 % (ref 0–0.5)
LYMPHOCYTES # BLD AUTO: 3.1 K/UL (ref 1–4.8)
LYMPHOCYTES NFR BLD: 43.5 % (ref 18–48)
MCH RBC QN AUTO: 22.8 PG (ref 27–31)
MCHC RBC AUTO-ENTMCNC: 30.5 G/DL (ref 32–36)
MCV RBC AUTO: 75 FL (ref 82–98)
MONOCYTES # BLD AUTO: 0.4 K/UL (ref 0.3–1)
MONOCYTES NFR BLD: 5.6 % (ref 4–15)
NEUTROPHILS # BLD AUTO: 3.5 K/UL (ref 1.8–7.7)
NEUTROPHILS NFR BLD: 49.4 % (ref 38–73)
NRBC BLD-RTO: 0 /100 WBC
PLATELET # BLD AUTO: 356 K/UL (ref 150–450)
PMV BLD AUTO: 11 FL (ref 9.2–12.9)
POTASSIUM SERPL-SCNC: 3.9 MMOL/L (ref 3.5–5.1)
PROT SERPL-MCNC: 7.5 G/DL (ref 6–8.4)
RBC # BLD AUTO: 5.14 M/UL (ref 4–5.4)
SODIUM SERPL-SCNC: 138 MMOL/L (ref 136–145)
TSH SERPL DL<=0.005 MIU/L-ACNC: 1.29 UIU/ML (ref 0.4–4)
WBC # BLD AUTO: 7.11 K/UL (ref 3.9–12.7)

## 2023-09-26 PROCEDURE — 36415 COLL VENOUS BLD VENIPUNCTURE: CPT | Mod: PO | Performed by: FAMILY MEDICINE

## 2023-09-26 PROCEDURE — 85025 COMPLETE CBC W/AUTO DIFF WBC: CPT | Performed by: FAMILY MEDICINE

## 2023-09-26 PROCEDURE — 80053 COMPREHEN METABOLIC PANEL: CPT | Performed by: FAMILY MEDICINE

## 2023-09-26 PROCEDURE — 84443 ASSAY THYROID STIM HORMONE: CPT | Performed by: FAMILY MEDICINE

## 2023-09-26 PROCEDURE — 83036 HEMOGLOBIN GLYCOSYLATED A1C: CPT | Performed by: FAMILY MEDICINE

## 2023-09-28 ENCOUNTER — HOSPITAL ENCOUNTER (EMERGENCY)
Facility: HOSPITAL | Age: 38
Discharge: HOME | End: 2023-09-28
Attending: EMERGENCY MEDICINE
Payer: OTHER MISCELLANEOUS

## 2023-09-28 VITALS
SYSTOLIC BLOOD PRESSURE: 108 MMHG | TEMPERATURE: 98 F | RESPIRATION RATE: 18 BRPM | OXYGEN SATURATION: 100 % | DIASTOLIC BLOOD PRESSURE: 70 MMHG | HEART RATE: 70 BPM

## 2023-09-28 DIAGNOSIS — M25.551 RIGHT HIP PAIN: ICD-10-CM

## 2023-09-28 PROCEDURE — 25000003 PHARM REV CODE 250

## 2023-09-28 PROCEDURE — 99284 EMERGENCY DEPT VISIT MOD MDM: CPT

## 2023-09-28 RX ORDER — METHOCARBAMOL 500 MG/1
1000 TABLET, FILM COATED ORAL 3 TIMES DAILY
Qty: 30 TABLET | Refills: 0 | Status: SHIPPED | OUTPATIENT
Start: 2023-09-28 | End: 2023-10-03

## 2023-09-28 RX ORDER — NAPROXEN 500 MG/1
500 TABLET ORAL 2 TIMES DAILY WITH MEALS
Qty: 6 TABLET | Refills: 0 | Status: SHIPPED | OUTPATIENT
Start: 2023-09-28 | End: 2023-10-01

## 2023-09-28 RX ORDER — ACETAMINOPHEN 325 MG/1
650 TABLET ORAL
Status: COMPLETED | OUTPATIENT
Start: 2023-09-28 | End: 2023-09-28

## 2023-09-28 RX ADMIN — ACETAMINOPHEN 650 MG: 325 TABLET ORAL at 01:09

## 2023-09-28 NOTE — ED PROVIDER NOTES
Encounter Date: 2023       History     Chief Complaint   Patient presents with    Hip Pain     R hip pain after trip and fall, no shortening or rotation noted, + DP pulse present, sensation intact. Unable to ambulate or bear weight on R leg.      38-year-old female with a past medical history of HTN presents to the ED complaining of right hip pain that started at 11:00 p.m. yesterday night.  She states that her leg was in the middle of a barricade and she was leaning back and someone pulled the barricade which caused her to have immediate pain in her right hip.  She notes the pain to be a 10/10 in terms of intensity and sharp in nature.  Movement exacerbates the pain.  She does note that she was able to ambulate immediately following the injury, however, she did sit down and since that time she is been unable to ambulate.  She also notes muscle spasms in her right thigh.  She denies any falls, head trauma, loss of consciousness or pain anywhere else.  No other complaints at this time.    The history is provided by the patient and medical records.     Review of patient's allergies indicates:   Allergen Reactions    Hydromorphone Anaphylaxis and Shortness Of Breath    Latex, natural rubber Rash     Past Medical History:   Diagnosis Date    Hypertension      Past Surgical History:   Procedure Laterality Date     SECTION  2006    HYSTERECTOMY       Family History   Problem Relation Age of Onset    Hypertension Mother     Cancer Maternal Grandmother     Diabetes Maternal Grandmother     Stroke Maternal Grandmother      Social History     Tobacco Use    Smoking status: Never    Smokeless tobacco: Never   Substance Use Topics    Alcohol use: Yes     Alcohol/week: 1.0 standard drink of alcohol     Types: 1 Standard drinks or equivalent per week    Drug use: No     Review of Systems    Physical Exam     Initial Vitals [23 0013]   BP Pulse Resp Temp SpO2   (!) 140/83 100 18 97.9 °F (36.6 °C) 99 %      MAP        --         Physical Exam    Nursing note and vitals reviewed.  Constitutional: Vital signs are normal. She appears well-developed and well-nourished. She is not diaphoretic. No distress.   Appears uncomfortable due to pain.   HENT:   Head: Normocephalic and atraumatic.   Right Ear: External ear normal.   Left Ear: External ear normal.   Eyes: EOM are normal. Right eye exhibits no discharge. Left eye exhibits no discharge.   Neck: Trachea normal. Neck supple. No thyroid mass present.   Normal range of motion.  Cardiovascular:  Normal rate, regular rhythm, normal heart sounds and intact distal pulses.     Exam reveals no gallop and no friction rub.       No murmur heard.  Good distal pulses in the bilateral lower extremities.   Pulmonary/Chest: Breath sounds normal. No respiratory distress. She has no wheezes. She has no rhonchi. She has no rales.   Abdominal: Abdomen is soft. Bowel sounds are normal. She exhibits no distension. There is no abdominal tenderness. There is no rebound and no guarding.   Musculoskeletal:         General: Tenderness (Right hip) present. No edema.      Cervical back: Normal range of motion and neck supple.      Comments: Range of motion of the left lower extremity.  Good passive range of motion of the right knee and right ankle.     Neurological: She is alert and oriented to person, place, and time. She has normal strength. No cranial nerve deficit or sensory deficit. GCS score is 15. GCS eye subscore is 4. GCS verbal subscore is 5. GCS motor subscore is 6.   Finger-to-nose testing is normal.   Skin: Skin is warm and dry. Capillary refill takes less than 2 seconds. No rash noted.   Psychiatric: She has a normal mood and affect.         ED Course   Procedures  Labs Reviewed   HIV 1 / 2 ANTIBODY   HEPATITIS C ANTIBODY          Imaging Results              X-Ray Hips Bilateral 2 View Incl AP Pelvis (Final result)  Result time 09/28/23 03:42:27      Final result by Darius Hoover MD  (09/28/23 03:42:27)                   Impression:      No acute radiographic abnormality in the hips or visualized pelvis.      Electronically signed by: Darius Hoover  Date:    09/28/2023  Time:    03:42               Narrative:    EXAMINATION:  XR HIPS BILATERAL 2 VIEW INCL AP PELVIS    CLINICAL HISTORY:  Pain in right hip    TECHNIQUE:  AP view of the pelvis and frogleg lateral views of both hips were performed.    COMPARISON:  None    FINDINGS:  Lower lumbosacral spine partially obscured by superimposed bowel.  Allowing for this, the better visualized portions of the osseous pelvic ring demonstrate no acute fracture deformity or traumatic diastasis.    No acute fracture deformity dislocation is apparent in either proximal femur.    Bilateral iliac crest enthesopathy.  Other scattered mild degenerative changes, including subchondral sclerosis in either acetabular roof.    Intrapelvic calcifications are most likely vascular.                                       Medications   acetaminophen tablet 650 mg (650 mg Oral Given 9/28/23 0142)     Medical Decision Making  38-year-old female who appears uncomfortable due to pain presents the ED status post right lower extremity injury.  ABC's intact.  Initial triage vital signs reveal hypertension and otherwise unremarkable.    Differential diagnosis includes but is not limited to fracture, dislocation, bone contusion, muscle strain.    Amount and/or Complexity of Data Reviewed  Radiology: ordered. Decision-making details documented in ED Course.    Risk  OTC drugs.  Prescription drug management.               ED Course as of 09/28/23 0404   Thu Sep 28, 2023   0135 Following initial evaluation I will obtain x-rays of the hips and also give the patient Tylenol for pain control.  I will re-evaluate following initial results. [BG]   0354 X-Ray Hips Bilateral 2 View Incl AP Pelvis  No acute radiographic abnormality in the hips or visualized pelvis. [BG]   0404 On  re-evaluation patient is able to ambulate but with some difficulty.  Had a shared decision-making conversation and the patient is comfortable with discharge and pain management at home.  Return precautions provided.  Orthopedic surgery outpatient referral placed.  Patient understands and agrees with the plan.  Will discharge. [BG]      ED Course User Index  [BG] Angelica Mcdowell MD                    Clinical Impression:   Final diagnoses:  [M25.551] Right hip pain        ED Disposition Condition    Discharge Stable          ED Prescriptions       Medication Sig Dispense Start Date End Date Auth. Provider    methocarbamoL (ROBAXIN) 500 MG Tab Take 2 tablets (1,000 mg total) by mouth 3 (three) times daily. for 5 days 30 tablet 9/28/2023 10/3/2023 Angelica Mcdowell MD    naproxen (NAPROSYN) 500 MG tablet Take 1 tablet (500 mg total) by mouth 2 (two) times daily with meals. for 3 days 6 tablet 9/28/2023 10/1/2023 Angelica Mcdowell MD          Follow-up Information       Follow up With Specialties Details Why Contact Info    Blanca Last MD Family Medicine Schedule an appointment as soon as possible for a visit in 1 week As needed [address] [facility] [address]  [phone]      [facility] - Emergency Dept Emergency Medicine Go to  If symptoms worsen [address]  [facility] [id number] [phone]    [facility] Orthopedics Schedule an appointment as soon as possible for a visit   [address]  [facility] [address]  [phone]             Angelica Mcdowell MD  Resident  09/28/23 9535

## 2023-09-28 NOTE — Clinical Note
"Karuna"Ayan Sandoval was seen and treated in our emergency department on 9/28/2023.  She may return to work on 10/02/2023.  Patient can perform her normal work duty from home      If you have any questions or concerns, please don't hesitate to call.      Mehreen Guo RN    "

## 2023-09-28 NOTE — DISCHARGE INSTRUCTIONS
Please return to the emergency department if you develop any new or worsening symptoms.  This could include worsening pain, changes in ambulatory status, fever.    Otherwise follow-up with your primary care physician in 1 week to discuss your most recent ED visit.  I gave you the contact information for the orthopedic surgeons so that you can follow-up with them.    Please take the medication as prescribed for pain.

## 2023-09-28 NOTE — Clinical Note
"Karuna"Ayan Sandoval was seen and treated in our emergency department on 9/28/2023.  She may return to work on 10/02/2023.  Patient can not perform physical activity for the next 3 days.     If you have any questions or concerns, please don't hesitate to call.      Angelica Mcdowell MD"

## 2023-09-28 NOTE — ED TRIAGE NOTES
Karuna Sandoval, a 38 y.o. female presents to the ED w/ complaint of right sided hip pain that started at work. Pt was leaning on a barricade when it was yanked away her leg went with it.      Triage note:  Chief Complaint   Patient presents with    Hip Pain     R hip pain after trip and fall, no shortening or rotation noted, + DP pulse present, sensation intact. Unable to ambulate or bear weight on R leg.      Review of patient's allergies indicates:   Allergen Reactions    Hydromorphone Anaphylaxis and Shortness Of Breath    Latex, natural rubber Rash     Past Medical History:   Diagnosis Date    Hypertension

## 2023-10-06 ENCOUNTER — PATIENT OUTREACH (OUTPATIENT)
Dept: RESEARCH | Facility: CLINIC | Age: 38
End: 2023-10-06
Payer: COMMERCIAL

## 2023-10-06 NOTE — PROGRESS NOTES
10/06/2023  8:09 AM    Patient Name Karuna Sandoval   Appointment Department [facility]NAG[facility]  Visit Type Audio (Virtual visit)    Clinic Weights   Wt Readings from Last 3 Encounters:   03/29/23 1436 104.8 kg (231 lb 0.7 oz)   03/22/23 0737 104.3 kg (230 lb)   10/13/22 1010 106.8 kg (235 lb 7.2 oz)     Last Reported Weights No data was found      [facility] INTERVENTION CONTACT:   Method of contact:  Phone Call   or Participant-Initiated Contact?:  -initiated contact  Type of intervention Contact:  Scheduled Session  Did the participant attend session?: Yes    Was the patient called within 15 minutes of the scheduled appointment?:  Yes  Is this a make-up session?: No    Which session materials covered in session?:  Session 20 and Session 21  Patient Reported Weight (From External Bita):  220  Time workout: injured.  Average Daily Steps: injured.  Calorie Prescription::  2000  Safety Criteria Triggered:  None  Toolbox Triggered:  None  Weight Graph Stoplight Status for Dietary Adherence:  Green Light       Goals    None          Additional Notes:    Weekly sessions 20 & 21 due to health  being out last week.     Patient states that last week she actually hurt her (bad) hip at the [employer] concert. She states that rest is the only thing that helps it feel better. Patient is disappointed that this happened because she was looking forward to exercising again this month but now is forced to rest to ensure her hip will recover.     Is also concerned about her digestion & constipation. Patient explained that random foods (fruit, bread, etc...) are hurting her stomach badly, and that she has not been able to use the restroom for days & weeks at a time. She can usually treat this with at home remedies but they are not working anymore and is scheduled to see the GI end of this month.     Going to see PCP next week and hoping to get allergy blood tested to see what/if she has food allergies. Son is  deathly allergic to shellfish so she suspects that something may be affecting her.     No goals discussed this week as not appropriate for this conversation. Health  encouraging rest to recover hip and will re-group after PCP visit next week.

## 2023-10-12 ENCOUNTER — OFFICE VISIT (OUTPATIENT)
Dept: FAMILY MEDICINE | Facility: CLINIC | Age: 38
End: 2023-10-12
Payer: COMMERCIAL

## 2023-10-12 VITALS
HEART RATE: 79 BPM | SYSTOLIC BLOOD PRESSURE: 110 MMHG | HEIGHT: 66 IN | OXYGEN SATURATION: 99 % | DIASTOLIC BLOOD PRESSURE: 76 MMHG | WEIGHT: 225.06 LBS | BODY MASS INDEX: 36.17 KG/M2

## 2023-10-12 DIAGNOSIS — Z28.21 INFLUENZA VACCINATION DECLINED: ICD-10-CM

## 2023-10-12 DIAGNOSIS — F33.1 MODERATE EPISODE OF RECURRENT MAJOR DEPRESSIVE DISORDER: ICD-10-CM

## 2023-10-12 DIAGNOSIS — K58.1 IRRITABLE BOWEL SYNDROME WITH CONSTIPATION: ICD-10-CM

## 2023-10-12 DIAGNOSIS — I10 ESSENTIAL HYPERTENSION: Primary | ICD-10-CM

## 2023-10-12 DIAGNOSIS — N18.31 STAGE 3A CHRONIC KIDNEY DISEASE: ICD-10-CM

## 2023-10-12 DIAGNOSIS — D64.9 ANEMIA, UNSPECIFIED TYPE: ICD-10-CM

## 2023-10-12 DIAGNOSIS — E66.01 CLASS 2 SEVERE OBESITY DUE TO EXCESS CALORIES WITH SERIOUS COMORBIDITY AND BODY MASS INDEX (BMI) OF 36.0 TO 36.9 IN ADULT: ICD-10-CM

## 2023-10-12 PROBLEM — F43.21 ADJUSTMENT DISORDER WITH DEPRESSED MOOD: Status: RESOLVED | Noted: 2021-10-16 | Resolved: 2023-10-12

## 2023-10-12 PROCEDURE — 3066F PR DOCUMENTATION OF TREATMENT FOR NEPHROPATHY: ICD-10-PCS | Mod: CPTII,S$GLB,, | Performed by: FAMILY MEDICINE

## 2023-10-12 PROCEDURE — 1160F RVW MEDS BY RX/DR IN RCRD: CPT | Mod: CPTII,S$GLB,, | Performed by: FAMILY MEDICINE

## 2023-10-12 PROCEDURE — 99999 PR PBB SHADOW E&M-EST. PATIENT-LVL IV: CPT | Mod: PBBFAC,,, | Performed by: FAMILY MEDICINE

## 2023-10-12 PROCEDURE — 3044F PR MOST RECENT HEMOGLOBIN A1C LEVEL <7.0%: ICD-10-PCS | Mod: CPTII,S$GLB,, | Performed by: FAMILY MEDICINE

## 2023-10-12 PROCEDURE — 1159F PR MEDICATION LIST DOCUMENTED IN MEDICAL RECORD: ICD-10-PCS | Mod: CPTII,S$GLB,, | Performed by: FAMILY MEDICINE

## 2023-10-12 PROCEDURE — 3008F BODY MASS INDEX DOCD: CPT | Mod: CPTII,S$GLB,, | Performed by: FAMILY MEDICINE

## 2023-10-12 PROCEDURE — 3061F PR NEG MICROALBUMINURIA RESULT DOCUMENTED/REVIEW: ICD-10-PCS | Mod: CPTII,S$GLB,, | Performed by: FAMILY MEDICINE

## 2023-10-12 PROCEDURE — 1159F MED LIST DOCD IN RCRD: CPT | Mod: CPTII,S$GLB,, | Performed by: FAMILY MEDICINE

## 2023-10-12 PROCEDURE — 99214 PR OFFICE/OUTPT VISIT, EST, LEVL IV, 30-39 MIN: ICD-10-PCS | Mod: S$GLB,,, | Performed by: FAMILY MEDICINE

## 2023-10-12 PROCEDURE — 4010F ACE/ARB THERAPY RXD/TAKEN: CPT | Mod: CPTII,S$GLB,, | Performed by: FAMILY MEDICINE

## 2023-10-12 PROCEDURE — 99214 OFFICE O/P EST MOD 30 MIN: CPT | Mod: S$GLB,,, | Performed by: FAMILY MEDICINE

## 2023-10-12 PROCEDURE — 3078F PR MOST RECENT DIASTOLIC BLOOD PRESSURE < 80 MM HG: ICD-10-PCS | Mod: CPTII,S$GLB,, | Performed by: FAMILY MEDICINE

## 2023-10-12 PROCEDURE — 3044F HG A1C LEVEL LT 7.0%: CPT | Mod: CPTII,S$GLB,, | Performed by: FAMILY MEDICINE

## 2023-10-12 PROCEDURE — 3066F NEPHROPATHY DOC TX: CPT | Mod: CPTII,S$GLB,, | Performed by: FAMILY MEDICINE

## 2023-10-12 PROCEDURE — 4010F PR ACE/ARB THEARPY RXD/TAKEN: ICD-10-PCS | Mod: CPTII,S$GLB,, | Performed by: FAMILY MEDICINE

## 2023-10-12 PROCEDURE — 3061F NEG MICROALBUMINURIA REV: CPT | Mod: CPTII,S$GLB,, | Performed by: FAMILY MEDICINE

## 2023-10-12 PROCEDURE — 3074F SYST BP LT 130 MM HG: CPT | Mod: CPTII,S$GLB,, | Performed by: FAMILY MEDICINE

## 2023-10-12 PROCEDURE — 99999 PR PBB SHADOW E&M-EST. PATIENT-LVL IV: ICD-10-PCS | Mod: PBBFAC,,, | Performed by: FAMILY MEDICINE

## 2023-10-12 PROCEDURE — 3074F PR MOST RECENT SYSTOLIC BLOOD PRESSURE < 130 MM HG: ICD-10-PCS | Mod: CPTII,S$GLB,, | Performed by: FAMILY MEDICINE

## 2023-10-12 PROCEDURE — 1160F PR REVIEW ALL MEDS BY PRESCRIBER/CLIN PHARMACIST DOCUMENTED: ICD-10-PCS | Mod: CPTII,S$GLB,, | Performed by: FAMILY MEDICINE

## 2023-10-12 PROCEDURE — 3078F DIAST BP <80 MM HG: CPT | Mod: CPTII,S$GLB,, | Performed by: FAMILY MEDICINE

## 2023-10-12 PROCEDURE — 3008F PR BODY MASS INDEX (BMI) DOCUMENTED: ICD-10-PCS | Mod: CPTII,S$GLB,, | Performed by: FAMILY MEDICINE

## 2023-10-12 NOTE — PROGRESS NOTES
Subjective:       Patient ID: Karuna Sandoval is a 38 y.o. female.    Chief Complaint: Hypertension    Patient Active Problem List   Diagnosis    Thyromegaly    Essential hypertension    Microcytosis    Stage 3a chronic kidney disease    Class 2 severe obesity due to excess calories with serious comorbidity and body mass index (BMI) of 36.0 to 36.9 in adult    Research subject    Moderate episode of recurrent major depressive disorder    Irritable bowel syndrome with constipation      Hypertension    37 yo female presents for follow up of HTN.   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 He is a senior in high school and looking at possible out of [employer]. Son is doing well, 32 ACT.   She's helping him navigate communications from colleges.    Prediabetes resolved.   Taking meds consistently. BP elevated with stress however a few times per week. Increase work demands and stressors.   Has FMLA paperwork today for intermittent missed days for fatigue, anxiety , depression.  IBS-C symptoms.     Review of Systems   All other systems reviewed and are negative.       Objective:     Vitals:    10/12/23 0711   BP: 110/76   Pulse: 79     Physical Exam  Vitals and nursing note reviewed.   Constitutional:       General: She is not in acute distress.     Appearance: Normal appearance. She is obese. She is not ill-appearing, toxic-appearing or diaphoretic.   HENT:      Head: Normocephalic and atraumatic.   Eyes:      General: No scleral icterus.     Conjunctiva/sclera: Conjunctivae normal.   Cardiovascular:      Rate and Rhythm: Normal rate.   Pulmonary:      Effort: Pulmonary effort is normal. No respiratory distress.   Skin:     Coloration: Skin is not pale.   Neurological:      Mental Status: She is alert. Mental status is at baseline.   Psychiatric:         Attention and Perception: Attention and perception normal.         Mood and Affect: Mood and affect normal.         Speech: Speech normal.          Behavior: Behavior normal.         Cognition and Memory: Cognition and memory normal.         Judgment: Judgment normal.       Assessment:       1. Essential hypertension    2. Stage 3a chronic kidney disease    3. Anemia, unspecified type    4. Moderate episode of recurrent major depressive disorder    5. Irritable bowel syndrome with constipation    6. Class 2 severe obesity due to excess calories with serious comorbidity and body mass index (BMI) of 36.0 to 36.9 in adult    7. Influenza vaccination declined        Plan:   Recent relevant labs results reviewed with patient.       1. Essential hypertension  -     Urinalysis, Reflex to Urine Culture Urine, Clean Catch; Future; Expected date: 10/12/2023  -     Renal Function Panel; Future; Expected date: 10/12/2023  - Chronic health condition is stable and controlled. Continue current medication regimen and relevant lifestyle modifications. Necessary medication refills addressed. Routine ongoing surveillance monitoring.   Continue with Lisinopril and HCTZ    2. Stage 3a chronic kidney disease  -     Renal Function Panel; Future; Expected date: 10/12/2023  - Mild decrease in GFR. Discussed ongoing hydration and HTN control.     3. Anemia, unspecified type  -     CBC Auto Differential; Future; Expected date: 10/12/2023  -     IRON AND TIBC; Future; Expected date: 10/12/2023  -     Ferritin; Future; Expected date: 10/12/2023  -     Reticulocytes; Future; Expected date: 10/12/2023  Possible ACD, recheck    4. Moderate episode of recurrent major depressive disorder  - No meds at this time. Adjustment mood disorder initially but long standing symptoms with ongoing life stressors.   Counseling if amendable.     5. Irritable bowel syndrome with constipation  Continue Linzess, stress management. Follow up with GI. Patient reports appt scheduled.     6. Class 2 severe obesity due to excess calories with serious comorbidity and body mass index (BMI) of 36.0 to 36.9 in  adult  -     Lipid Panel; Future; Expected date: 10/12/2023  -     Hemoglobin A1C; Future; Expected date: 10/12/2023    7. Influenza vaccination declined    Patient's questions answered. Plan reviewed with patient at the end of visit. Relevant precautions to chief complaint and reasons to seek further medical care or to contact the office sooner reviewed with patient.     Follow up in about 6 months (around 4/12/2024) for Hypertension Follow-up (prelabs), Annual Exam.

## 2023-10-13 ENCOUNTER — PATIENT OUTREACH (OUTPATIENT)
Dept: RESEARCH | Facility: CLINIC | Age: 38
End: 2023-10-13
Payer: COMMERCIAL

## 2023-10-13 NOTE — PROGRESS NOTES
"10/13/2023  7:55 AM    Patient Name Karuna Sandoval   Appointment Department [facility]NAG[facility]  Visit Type Audio (Virtual visit)    Clinic Weights   Wt Readings from Last 3 Encounters:   10/12/23 0711 102.1 kg (225 lb 1.4 oz)   03/29/23 1436 104.8 kg (231 lb 0.7 oz)   03/22/23 0737 104.3 kg (230 lb)     Last Reported Weights No data was found      [facility] INTERVENTION CONTACT:   Method of contact:  Phone Call   or Participant-Initiated Contact?:  -initiated contact  Type of intervention Contact:  Scheduled Session  Did the participant attend session?: Yes    Was the patient called within 15 minutes of the scheduled appointment?:  Yes  Is this a make-up session?: No    Which session materials covered in session?:  Session 22  Patient Reported Weight (From External Bita):  223  Time workout: not reported.  Average Daily Steps: not reported.  Calorie Prescription::  2000  Safety Criteria Triggered:  None  Toolbox Triggered:  None  Weight Graph Stoplight Status for Dietary Adherence:  Yellow Light - In the Zone       Goals    None          Additional Notes:    Acknowledges slight weight gain this week- current weight 223 lbs. Likely due to weighing with clothes every day.    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     At last office visit - Patient expressed great concern about her kidney function which has declined since her last office visit. Patient explained potential causes of this are her blood pressure which is highly affected by stress and her weight. Patient states this is a "wake up call" that she has to change the way she handles stress, diet, and is going to start exercising on Monday (joined [employer] last night).     Patient is going out of town this weekend with some friends and knows this is going to be a busy weekend. She is very determined to " "start exercising and eating differently when she returns.     States that her sleep quality is poor and "doesn't sleep." Has been prescribed medication in the past. Hoping to achieve improved sleep outcomes with weight loss and better diet & exercise. Stress management will also help.     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       Goal:   1) [employer], 30 minutes (bike, etc...), 2-3 days - doing what you can without re-injury to hip      SYEDA GomezEd.  [facility] [facility]   [phone]        "

## 2023-10-20 ENCOUNTER — PATIENT OUTREACH (OUTPATIENT)
Dept: RESEARCH | Facility: CLINIC | Age: 38
End: 2023-10-20
Payer: COMMERCIAL

## 2023-10-20 NOTE — PROGRESS NOTES
10/20/2023  8:38 AM    Patient Name Karuna Sandoval   Appointment Department [facility] WEIGHT MANAGEMENT  Visit Type Audio (Virtual visit)    Clinic Weights   Wt Readings from Last 3 Encounters:   10/12/23 0711 102.1 kg (225 lb 1.4 oz)   03/29/23 1436 104.8 kg (231 lb 0.7 oz)   03/22/23 0737 104.3 kg (230 lb)     Last Reported Weights No data was found      [facility] INTERVENTION CONTACT:   Method of contact:  Phone Call   or Participant-Initiated Contact?:  -initiated contact  Type of intervention Contact:  Scheduled Session  Did the participant attend session?: Yes    Was the patient called within 15 minutes of the scheduled appointment?:  Yes  Is this a make-up session?: No    Which session materials covered in session?:  Session 23  Patient Reported Weight (From External Bita):  225  Time workout: not reported.  Average Daily Steps: not reported.  Calorie Prescription::  2000  Safety Criteria Triggered:  None  Toolbox Triggered:  Adherence to diet  Adherence to diet: Health  must choose at least two interventions from list::  Use of motivational interviewing and Other (please comment) (Patient verbally agrees to refocus on herself this week through rest & prioritizing nutritional meals.)  Weight Graph Stoplight Status for Dietary Adherence:  Red Light       Goals    None          Additional Notes:    Patient reports doing okay but is physically exhausted when speaking this morning. She explained having a very long week for work and then was traveling the past two weeks. Patient is discouraged with being 225 pounds today and aims to Get Below that weight next week. She attributes weight gain to traveling, eating in restaurants, and drinking. Patient's plans for eating this week include her healthy grocery  and eating at home. For exercise, patient plans to exercise at the gym when she can. She explained that setting a goal for exercise feels overwhelming to her at this time and she may be  ready to declare a goal next week.    She is still experiencing the stomach/GI issues described a few weeks ago. Has a virtual doctor appointment next Thursday.    No SMART goals decided this week to allow time for rest and re-focus.     Tamika Carrasco M.Ed.  [facility]   [phone]

## 2023-10-26 ENCOUNTER — TELEPHONE (OUTPATIENT)
Dept: GASTROENTEROLOGY | Facility: CLINIC | Age: 38
End: 2023-10-26
Payer: COMMERCIAL

## 2023-10-26 ENCOUNTER — OFFICE VISIT (OUTPATIENT)
Dept: GASTROENTEROLOGY | Facility: CLINIC | Age: 38
End: 2023-10-26
Payer: COMMERCIAL

## 2023-10-26 DIAGNOSIS — K59.04 CHRONIC IDIOPATHIC CONSTIPATION: Primary | ICD-10-CM

## 2023-10-26 PROCEDURE — 3044F HG A1C LEVEL LT 7.0%: CPT | Mod: CPTII,95,,

## 2023-10-26 PROCEDURE — 3061F PR NEG MICROALBUMINURIA RESULT DOCUMENTED/REVIEW: ICD-10-PCS | Mod: CPTII,95,,

## 2023-10-26 PROCEDURE — 99204 PR OFFICE/OUTPT VISIT, NEW, LEVL IV, 45-59 MIN: ICD-10-PCS | Mod: 95,,,

## 2023-10-26 PROCEDURE — 4010F ACE/ARB THERAPY RXD/TAKEN: CPT | Mod: CPTII,95,,

## 2023-10-26 PROCEDURE — 3044F PR MOST RECENT HEMOGLOBIN A1C LEVEL <7.0%: ICD-10-PCS | Mod: CPTII,95,,

## 2023-10-26 PROCEDURE — 4010F PR ACE/ARB THEARPY RXD/TAKEN: ICD-10-PCS | Mod: CPTII,95,,

## 2023-10-26 PROCEDURE — 3066F PR DOCUMENTATION OF TREATMENT FOR NEPHROPATHY: ICD-10-PCS | Mod: CPTII,95,,

## 2023-10-26 PROCEDURE — 99204 OFFICE O/P NEW MOD 45 MIN: CPT | Mod: 95,,,

## 2023-10-26 PROCEDURE — 3066F NEPHROPATHY DOC TX: CPT | Mod: CPTII,95,,

## 2023-10-26 PROCEDURE — 3061F NEG MICROALBUMINURIA REV: CPT | Mod: CPTII,95,,

## 2023-10-26 RX ORDER — LINACLOTIDE 72 UG/1
72 CAPSULE, GELATIN COATED ORAL DAILY
Qty: 30 CAPSULE | Refills: 11 | Status: SHIPPED | OUTPATIENT
Start: 2023-10-26 | End: 2024-10-25

## 2023-10-26 NOTE — PROGRESS NOTES
GASTROENTEROLOGY CLINIC NOTE    Reason for visit: The encounter diagnosis was Chronic idiopathic constipation.  Referring provider/PCP: Blanca Last MD    The patient location is: [place]  Visit type: audiovisual  Face to Face time with patient: 12 mins  22 minutes of total time spent on the encounter, which includes face to face time and non-face to face time preparing to see the patient (eg, review of tests), Obtaining and/or reviewing separately obtained history, Documenting clinical information in the electronic or other health record, Independently interpreting results (not separately reported) and communicating results to the patient/family/caregiver, or Care coordination (not separately reported).     HPI:  Karuna Sandoval is a 38 y.o. female presents today for constipation. She reports having BM's every few days since early adulthood. Over the last 3 years BM's have lessened even more. She reports now going almost a week without having a BM. She uses enemas and linzess PRN, usually about 2x/week. Has tried miralax, stool softeners, and mag citrate in the past with minimal relief. Initially would cause liquid stool, now minimal amounts of stool. She had a colonsocopy about 7-8 years ago which was normal per pt report. She denies any blood in stool, no known FH of CRC or IBD.     Prior Endoscopy:  EGD:  Colon:    (Portions of this note were dictated using voice recognition software and may contain dictation related errors in spelling/grammar/syntax not found on text review)    Review of Systems   Gastrointestinal:  Positive for constipation. Negative for blood in stool.       Past Medical History: has a past medical history of Hypertension.    Past Surgical History: has a past surgical history that includes Hysterectomy and  section ().    Home medications:   Current Outpatient Medications on File Prior to Visit   Medication Sig Dispense Refill    FLONASE ALLERGY RELIEF 50  mcg/actuation nasal spray 2 sprays (100 mcg total) by Each Nostril route 2 (two) times daily. 16 g 1    hydroCHLOROthiazide (HYDRODIURIL) 25 MG tablet Take 1 tab po daily for high blood pressure 90 tablet 3    lisinopriL (PRINIVIL,ZESTRIL) 40 MG tablet Take 1 tablet (40 mg total) by mouth once daily. 90 tablet 3    loratadine (CLARITIN) 10 mg tablet Take 1 tablet (10 mg total) by mouth once daily. 90 tablet 3    methocarbamoL (ROBAXIN) 750 MG Tab Take 1,500 mg by mouth 3 (three) times daily as needed.      triamcinolone acetonide 0.1% (KENALOG) 0.1 % cream Apply topically 2 (two) times daily. 45 g 0    [DISCONTINUED] LINZESS 72 mcg Cap capsule Take 72 mcg by mouth once daily.       No current facility-administered medications on file prior to visit.       Vital signs:  There were no vitals taken for this visit.    Physical Exam  Vitals reviewed: limited d/t telemedicine.   Constitutional:       Appearance: Normal appearance.   Neurological:      Mental Status: She is alert.       Each patient to whom he or she provides medical services by telemedicine is:  (1) informed of the relationship between the physician and patient and the respective role of any other health care provider with respect to management of the patient; and (2) notified that he or she may decline to receive medical services by telemedicine and may withdraw from such care at any time.    I have reviewed associated labs, imaging and notes.     Assessment:  1. Chronic idiopathic constipation    CIC, longstanding   Worsened over the past 3 years   BM's about 1/week   Enemas and linzess PRN  TSH WNL  No relief with multiple OTC products  No warning signs present    Plan:  Orders Placed This Encounter    LINZESS 72 mcg Cap capsule     Complete mini miralax cleanout  Start taking linzess daily   Increase if needed    F/U in 2-3 months    Nadine More NP  [facility] [place]

## 2023-10-27 ENCOUNTER — PATIENT OUTREACH (OUTPATIENT)
Dept: RESEARCH | Facility: CLINIC | Age: 38
End: 2023-10-27
Payer: COMMERCIAL

## 2023-10-27 NOTE — PROGRESS NOTES
"10/27/2023  7:59 AM    Patient Name Karuna Sandoval   Appointment Department [facility] JOSE WEIGHT MANAGEMENT  Visit Type Audio (Virtual visit)    Clinic Weights   Wt Readings from Last 3 Encounters:   10/12/23 0711 102.1 kg (225 lb 1.4 oz)   03/29/23 1436 104.8 kg (231 lb 0.7 oz)   03/22/23 0737 104.3 kg (230 lb)     Last Reported Weights No data was found      [facility] INTERVENTION CONTACT:   Method of contact:  Phone Call   or Participant-Initiated Contact?:  -initiated contact  Type of intervention Contact:  Scheduled Session  Did the participant attend session?: Yes    Was the patient called within 15 minutes of the scheduled appointment?:  Yes  Is this a make-up session?: No    Which session materials covered in session?:  Session 24  Patient Reported Weight (From External Bita):  224  Time workout: not specified.  Average Daily Steps: not reported.  Calorie Prescription::  2000  Safety Criteria Triggered:  None  Toolbox Triggered:  Adherence to diet  Adherence to diet: Health  must choose at least two interventions from list::  Problem solving with SMART goals and Use of motivational interviewing  Weight Graph Stoplight Status for Dietary Adherence:  Red Light       Goals    None          Additional Notes:    Patient reports doing okay today. She states this past week was "the worst" for several reasons she didn't want to discuss today. She expressed frustration over the GI appointment she had yesterday and concerns about the medication and side effects recommended to her. She states that another source of stress in her life is her son's college application process.     She was proud that she went to the gym twice this week and plans to go a third time on Sunday. Her goal is to increase to working out 3 days/week. States that the elliptical machine hurt her hip, but she can ride a stationary bike without pain.     Patient states that her main goal right now is to lose the weight she gained from " her recent trips (about 6 lbs). She states that she knows how to do this (diet and exercise) but has had barriers like time and social events that get in her way of progress.     She does have a good friend on a similar journey and may be a good influence on her (eating healthier at restaurants & going to the gym).    Goals:   Consistency at the gym, going 2-3 times/week  Following a healthy diet to promote weight loss      SYEDA GomezEd.  [facility]   [phone]

## 2023-11-24 ENCOUNTER — PATIENT OUTREACH (OUTPATIENT)
Dept: RESEARCH | Facility: CLINIC | Age: 38
End: 2023-11-24
Payer: COMMERCIAL

## 2023-11-24 NOTE — PROGRESS NOTES
11/24/2023  8:10 AM    Patient Name Karuna Sandoval   Appointment Department [facility] PROP WEIGHT MANAGEMENT  Visit Type Audio (Virtual visit)    Clinic Weights   Wt Readings from Last 3 Encounters:   10/12/23 0711 102.1 kg (225 lb 1.4 oz)   03/29/23 1436 104.8 kg (231 lb 0.7 oz)   03/22/23 0737 104.3 kg (230 lb)     Last Reported Weights No data was found      [facility] INTERVENTION CONTACT:   Method of contact:  Phone Call   or Participant-Initiated Contact?:  -initiated contact  Type of intervention Contact:  Scheduled Session  Did the participant attend session?: Yes    Was the patient called within 15 minutes of the scheduled appointment?:  Yes  Is this a make-up session?: No    Which session materials covered in session?:  Session 25  Patient Reported Weight (From External Bita):  229  Time workout: none.  Average Daily Steps: not reported.  Calorie Prescription::  2000  Safety Criteria Triggered:  None  Toolbox Triggered:  Adherence to diet  Adherence to diet: Health  must choose at least two interventions from list::  Problem solving with SMART goals and Use of motivational interviewing  Weight Graph Stoplight Status for Dietary Adherence:  Red Light       Goals    None          Additional Notes:    Patient reports that she is still sick from the last several weeks. She is feeling slightly better today and hopes to be well enough to exercise next week. Admits that due to traveling and being sick, she has not been following her 2,000 calorie diet and has not been exercising at her gym. She admits that she is not motivated to exercise when it is cold and raining after work and she prefers to go home.     Agrees to start improving habits to support weight loss. Attributes weight gain to being sick. Has a GI appointment next week.     Goals:   1) Resume exercise routine at the gym  2) Start following 2,000 calorie diet (or meal plan)    Tamika Carrasco M.Ed.  [facility]    [phone]

## 2023-12-22 ENCOUNTER — PATIENT OUTREACH (OUTPATIENT)
Dept: RESEARCH | Facility: CLINIC | Age: 38
End: 2023-12-22
Payer: COMMERCIAL

## 2023-12-22 NOTE — PROGRESS NOTES
12/22/2023  8:24 AM    Patient Name Karuna Sandoval   Appointment Department [facility] JOSE WEIGHT MANAGEMENT  Visit Type Audio (Virtual visit)    Clinic Weights   Wt Readings from Last 3 Encounters:   10/12/23 0711 102.1 kg (225 lb 1.4 oz)   03/29/23 1436 104.8 kg (231 lb 0.7 oz)   03/22/23 0737 104.3 kg (230 lb)     Last Reported Weights No data was found      [facility] INTERVENTION CONTACT:   Method of contact:  Phone Call   or Participant-Initiated Contact?:  -initiated contact  Type of intervention Contact:  Scheduled Session  Did the participant attend session?: Yes    Was the patient called within 15 minutes of the scheduled appointment?:  Yes  Is this a make-up session?: No    Which session materials covered in session?:  Session 26  Patient Reported Weight (From External Bita):  230 (Last weight taken on 12/4/23)  Time workout: not specified.  Average Daily Steps: not reported.  Calorie Prescription::  2000  Safety Criteria Triggered:  None  Toolbox Triggered:  Physical Activity and Self-Monitoring  Physical Activity::  Work with patient on stimulus control to determine how to best incorporate physical activity into their daily routine (ex. Have patient identify positive and negative stimuli). and Develop time management skills with patient.  Self monitoring::  Health  and participant to review self-monitoring strategies  Weight Graph Stoplight Status for Dietary Adherence:  Red Light       Goals    None          Additional Notes:    Monthly coaching call to patient. She expressed that she is feeling tired and ready for the holidays to be over. She explained that she has been working extra hard and extra hours to afford to do Leslie shopping for her family. She states that during this time she has neglected her own healthy by not exercising or taking time for herself.     Patient is Poorly Motivated with being 230 pounds today and aims to Get Below that weight next month. For exercise, patient  plans to start going back to the gym 3 times/week next month and increase ultimately to 5 days/week. She has a gym accountability luz maria who will help ensure she sticks with her plan.     Discussed importance of self-monitoring and weighing at home. Patient expressed that she feels discouraged when she sees her weight gain. Encouraged her to weigh at least once/week and can cover up the number or stand on the scale backwards to avoid seeing her number.     Goals:   1) Exercising at the gym 3 days/week, after New Year  2) Weighing consistently (at least once/week) for accountability    Tamika Carrasco M.Ed.  [facility]   [phone]

## 2024-01-19 ENCOUNTER — PATIENT OUTREACH (OUTPATIENT)
Dept: RESEARCH | Facility: CLINIC | Age: 39
End: 2024-01-19
Payer: COMMERCIAL

## 2024-01-19 NOTE — PROGRESS NOTES
01/19/2024  8:16 AM    Patient Name Karuna Sandoval   Appointment Department [facility]BREON[facility] WEIGHT MANAGEMENT  Visit Type Audio (Virtual visit)    Clinic Weights   Wt Readings from Last 3 Encounters:   10/12/23 0711 102.1 kg (225 lb 1.4 oz)   03/29/23 1436 104.8 kg (231 lb 0.7 oz)   03/22/23 0737 104.3 kg (230 lb)     Last Reported Weights No data was found      [facility] INTERVENTION CONTACT:   Method of contact:  Phone Call   or Participant-Initiated Contact?:  -initiated contact  Type of intervention Contact:  Scheduled Session  Did the participant attend session?: Yes    Was the patient called within 15 minutes of the scheduled appointment?:  Yes  Is this a make-up session?: No    Which session materials covered in session?:  Session 27  Patient Reported Weight (From External Bita):  239  Time workout: not reported.  Average Daily Steps: not reported.  Calorie Prescription::  2000  Safety Criteria Triggered:  None  Toolbox Triggered:  Adherence to diet and Self-Monitoring  Adherence to diet: Health  must choose at least two interventions from list::  Use of motivational interviewing and Problem solving with SMART goals  Self monitoring::  Health  and participant to review self-monitoring strategies  Weight Graph Stoplight Status for Dietary Adherence:  Red Light       Goals    None          Additional Notes:    Monthly follow up call to patient. Patient reports Doing Well. Patient is somewhat motivated with being 239 pounds today and aims to Get Below that weight next week. Patient's plans for eating this week include meal prepping on Sunday and reducing her overall meat intake. Patient is disappointed that she has not been exercising like she wants to but her priority has shifted and is now working more hours to increase her income.     She is discouraged that she has not lost any weight and attributes this to not exercising and her digestion (constipation) struggles. She is encouraged though  that she has not gained any weight.     Acknowledges that her diet is within her control at this time and she has been considering eating less meat to see if this affects her digestion.     Goals:   1) Meal prepping on Sunday:    - salads   - beans & rice   2) Continue to weigh daily at home (when not traveling)    Tamika Carrasco M.Ed.  [facility]   [phone]

## 2024-02-16 ENCOUNTER — PATIENT OUTREACH (OUTPATIENT)
Dept: RESEARCH | Facility: CLINIC | Age: 39
End: 2024-02-16
Payer: COMMERCIAL

## 2024-02-16 NOTE — PROGRESS NOTES
02/16/2024  9:12 AM    Patient Name Karuna Sandoval   Appointment Department [facility]BREONNA WEIGHT MANAGEMENT  Visit Type Audio (Virtual visit)    Clinic Weights   Wt Readings from Last 3 Encounters:   10/12/23 0711 102.1 kg (225 lb 1.4 oz)   03/29/23 1436 104.8 kg (231 lb 0.7 oz)   03/22/23 0737 104.3 kg (230 lb)     Last Reported Weights No data was found      [facility] INTERVENTION CONTACT:   Method of contact:  Phone Call   or Participant-Initiated Contact?:  -initiated contact  Type of intervention Contact:  Scheduled Session  Did the participant attend session?: Yes    Was the patient called within 15 minutes of the scheduled appointment?:  Yes  Is this a make-up session?: No    Which session materials covered in session?:  Session 28  Patient Reported Weight (From External Bita):  238  Time workout: not reported.  Average Daily Steps: not reported.  Calorie Prescription::  2000  Safety Criteria Triggered:  None  Toolbox Triggered:  Adherence to diet  Adherence to diet: Health  must choose at least two interventions from list::  Problem solving with SMART goals and Use of motivational interviewing  Weight Graph Stoplight Status for Dietary Adherence:  Red Light       Goals    None          Additional Notes:    Monthly follow up call to patient. Patient explained that she is suffering with severe back pain and still having the same struggles with her stomach/digestion. Appointment with gastroenterologist scheduled for next month. Patient is very discouraged with lack of weight loss - she attributes to these issues.    Discussed stress as there are many external factors of stress in her life (son, mother, sister, and job). Discussed issues that she can control like her diet, how she reacts to people, and what she does for stress relief (taking day off work for herself).     Over the next month she hopes that some of her circumstances will change for the better (appointment for gastroenterology and  possible new job).     Did not state specific goals today.    SYEDA GomezEd.  [facility]   [phone]

## 2024-03-15 ENCOUNTER — PATIENT OUTREACH (OUTPATIENT)
Dept: RESEARCH | Facility: CLINIC | Age: 39
End: 2024-03-15
Payer: COMMERCIAL

## 2024-03-15 NOTE — PROGRESS NOTES
03/15/2024  9:48 AM    Patient Name Karuna Sandoval   Appointment Department [facility] PROP WEIGHT MANAGEMENT  Visit Type Audio (Virtual visit)    Clinic Weights   Wt Readings from Last 3 Encounters:   10/12/23 0711 102.1 kg (225 lb 1.4 oz)   03/29/23 1436 104.8 kg (231 lb 0.7 oz)   03/22/23 0737 104.3 kg (230 lb)     Last Reported Weights No data was found      [facility] INTERVENTION CONTACT:   Method of contact:  Phone Call   or Participant-Initiated Contact?:  -initiated contact  Type of intervention Contact:  Scheduled Session  Did the participant attend session?: No    If no, please select a reason::  Illness  Safety Criteria Triggered:  None  Weight Graph Stoplight Status for Dietary Adherence:  Red Light       Goals    None          Additional Notes:    Patient messaged to cancel appointment d/t flu virus.     Reschedule not confirmed.     SYEDA GomezEd.  [facility]   [phone]

## 2024-04-08 ENCOUNTER — HOSPITAL ENCOUNTER (OUTPATIENT)
Dept: RADIOLOGY | Facility: HOSPITAL | Age: 39
Discharge: HOME | End: 2024-04-08
Attending: FAMILY MEDICINE
Payer: COMMERCIAL

## 2024-04-08 VITALS — BODY MASS INDEX: 36.16 KG/M2 | WEIGHT: 225 LBS | HEIGHT: 66 IN

## 2024-04-08 DIAGNOSIS — Z12.31 ENCOUNTER FOR SCREENING MAMMOGRAM FOR MALIGNANT NEOPLASM OF BREAST: ICD-10-CM

## 2024-04-08 PROCEDURE — 77063 BREAST TOMOSYNTHESIS BI: CPT | Mod: 26,,, | Performed by: RADIOLOGY

## 2024-04-08 PROCEDURE — 77067 SCR MAMMO BI INCL CAD: CPT | Mod: 26,,, | Performed by: RADIOLOGY

## 2024-04-08 PROCEDURE — 77067 SCR MAMMO BI INCL CAD: CPT | Mod: TC

## 2024-04-09 ENCOUNTER — LAB VISIT (OUTPATIENT)
Dept: LAB | Facility: HOSPITAL | Age: 39
End: 2024-04-09
Attending: FAMILY MEDICINE
Payer: COMMERCIAL

## 2024-04-09 DIAGNOSIS — I10 ESSENTIAL HYPERTENSION: ICD-10-CM

## 2024-04-09 DIAGNOSIS — N18.31 STAGE 3A CHRONIC KIDNEY DISEASE: ICD-10-CM

## 2024-04-09 DIAGNOSIS — E66.01 CLASS 2 SEVERE OBESITY DUE TO EXCESS CALORIES WITH SERIOUS COMORBIDITY AND BODY MASS INDEX (BMI) OF 36.0 TO 36.9 IN ADULT: ICD-10-CM

## 2024-04-09 DIAGNOSIS — D64.9 ANEMIA, UNSPECIFIED TYPE: ICD-10-CM

## 2024-04-09 LAB
ALBUMIN SERPL BCP-MCNC: 4 G/DL (ref 3.5–5.2)
ANION GAP SERPL CALC-SCNC: 11 MMOL/L (ref 8–16)
BASOPHILS # BLD AUTO: 0.03 K/UL (ref 0–0.2)
BASOPHILS NFR BLD: 0.3 % (ref 0–1.9)
BUN SERPL-MCNC: 18 MG/DL (ref 6–20)
CALCIUM SERPL-MCNC: 10.2 MG/DL (ref 8.7–10.5)
CHLORIDE SERPL-SCNC: 104 MMOL/L (ref 95–110)
CHOLEST SERPL-MCNC: 158 MG/DL (ref 120–199)
CHOLEST/HDLC SERPL: 4.6 {RATIO} (ref 2–5)
CO2 SERPL-SCNC: 23 MMOL/L (ref 23–29)
CREAT SERPL-MCNC: 1.5 MG/DL (ref 0.5–1.4)
DIFFERENTIAL METHOD BLD: ABNORMAL
EOSINOPHIL # BLD AUTO: 0.1 K/UL (ref 0–0.5)
EOSINOPHIL NFR BLD: 1.5 % (ref 0–8)
ERYTHROCYTE [DISTWIDTH] IN BLOOD BY AUTOMATED COUNT: 17.6 % (ref 11.5–14.5)
EST. GFR  (NO RACE VARIABLE): 44.6 ML/MIN/1.73 M^2
ESTIMATED AVG GLUCOSE: 123 MG/DL (ref 68–131)
FERRITIN SERPL-MCNC: 74 NG/ML (ref 20–300)
GLUCOSE SERPL-MCNC: 100 MG/DL (ref 70–110)
HBA1C MFR BLD: 5.9 % (ref 4–5.6)
HCT VFR BLD AUTO: 40.9 % (ref 37–48.5)
HDLC SERPL-MCNC: 34 MG/DL (ref 40–75)
HDLC SERPL: 21.5 % (ref 20–50)
HGB BLD-MCNC: 12.6 G/DL (ref 12–16)
IMM GRANULOCYTES # BLD AUTO: 0.02 K/UL (ref 0–0.04)
IMM GRANULOCYTES NFR BLD AUTO: 0.2 % (ref 0–0.5)
IRON SERPL-MCNC: 41 UG/DL (ref 30–160)
LDLC SERPL CALC-MCNC: 97.8 MG/DL (ref 63–159)
LYMPHOCYTES # BLD AUTO: 3.4 K/UL (ref 1–4.8)
LYMPHOCYTES NFR BLD: 40 % (ref 18–48)
MCH RBC QN AUTO: 22.5 PG (ref 27–31)
MCHC RBC AUTO-ENTMCNC: 30.8 G/DL (ref 32–36)
MCV RBC AUTO: 73 FL (ref 82–98)
MONOCYTES # BLD AUTO: 0.6 K/UL (ref 0.3–1)
MONOCYTES NFR BLD: 7.5 % (ref 4–15)
NEUTROPHILS # BLD AUTO: 4.3 K/UL (ref 1.8–7.7)
NEUTROPHILS NFR BLD: 50.5 % (ref 38–73)
NONHDLC SERPL-MCNC: 124 MG/DL
NRBC BLD-RTO: 0 /100 WBC
PHOSPHATE SERPL-MCNC: 3.8 MG/DL (ref 2.7–4.5)
PLATELET # BLD AUTO: 366 K/UL (ref 150–450)
PMV BLD AUTO: 11.2 FL (ref 9.2–12.9)
POTASSIUM SERPL-SCNC: 4.2 MMOL/L (ref 3.5–5.1)
RBC # BLD AUTO: 5.59 M/UL (ref 4–5.4)
RETICS/RBC NFR AUTO: 1.7 % (ref 0.5–2.5)
SATURATED IRON: 12 % (ref 20–50)
SODIUM SERPL-SCNC: 138 MMOL/L (ref 136–145)
TOTAL IRON BINDING CAPACITY: 348 UG/DL (ref 250–450)
TRANSFERRIN SERPL-MCNC: 235 MG/DL (ref 200–375)
TRIGL SERPL-MCNC: 131 MG/DL (ref 30–150)
WBC # BLD AUTO: 8.58 K/UL (ref 3.9–12.7)

## 2024-04-09 PROCEDURE — 83036 HEMOGLOBIN GLYCOSYLATED A1C: CPT | Performed by: FAMILY MEDICINE

## 2024-04-09 PROCEDURE — 80069 RENAL FUNCTION PANEL: CPT | Performed by: FAMILY MEDICINE

## 2024-04-09 PROCEDURE — 83540 ASSAY OF IRON: CPT | Performed by: FAMILY MEDICINE

## 2024-04-09 PROCEDURE — 82728 ASSAY OF FERRITIN: CPT | Performed by: FAMILY MEDICINE

## 2024-04-09 PROCEDURE — 85025 COMPLETE CBC W/AUTO DIFF WBC: CPT | Performed by: FAMILY MEDICINE

## 2024-04-09 PROCEDURE — 36415 COLL VENOUS BLD VENIPUNCTURE: CPT | Mod: PO | Performed by: FAMILY MEDICINE

## 2024-04-09 PROCEDURE — 80061 LIPID PANEL: CPT | Performed by: FAMILY MEDICINE

## 2024-04-09 PROCEDURE — 85045 AUTOMATED RETICULOCYTE COUNT: CPT | Performed by: FAMILY MEDICINE

## 2024-04-10 PROBLEM — R73.03 PREDIABETES: Status: ACTIVE | Noted: 2024-04-10

## 2024-04-12 ENCOUNTER — OFFICE VISIT (OUTPATIENT)
Dept: FAMILY MEDICINE | Facility: CLINIC | Age: 39
End: 2024-04-12
Payer: COMMERCIAL

## 2024-04-12 VITALS
OXYGEN SATURATION: 95 % | HEIGHT: 66 IN | BODY MASS INDEX: 39.79 KG/M2 | DIASTOLIC BLOOD PRESSURE: 80 MMHG | HEART RATE: 112 BPM | SYSTOLIC BLOOD PRESSURE: 118 MMHG | WEIGHT: 247.56 LBS

## 2024-04-12 DIAGNOSIS — R73.03 PREDIABETES: ICD-10-CM

## 2024-04-12 DIAGNOSIS — M79.604 LOW BACK PAIN RADIATING TO RIGHT LOWER EXTREMITY: ICD-10-CM

## 2024-04-12 DIAGNOSIS — Z12.31 ENCOUNTER FOR SCREENING MAMMOGRAM FOR BREAST CANCER: ICD-10-CM

## 2024-04-12 DIAGNOSIS — R63.8 UNABLE TO LOSE WEIGHT: ICD-10-CM

## 2024-04-12 DIAGNOSIS — M54.50 LOW BACK PAIN RADIATING TO RIGHT LOWER EXTREMITY: ICD-10-CM

## 2024-04-12 DIAGNOSIS — N18.31 STAGE 3A CHRONIC KIDNEY DISEASE: ICD-10-CM

## 2024-04-12 DIAGNOSIS — F33.1 MODERATE EPISODE OF RECURRENT MAJOR DEPRESSIVE DISORDER: ICD-10-CM

## 2024-04-12 DIAGNOSIS — Z00.01 ENCOUNTER FOR GENERAL ADULT MEDICAL EXAMINATION WITH ABNORMAL FINDINGS: Primary | ICD-10-CM

## 2024-04-12 DIAGNOSIS — E66.01 CLASS 2 SEVERE OBESITY DUE TO EXCESS CALORIES WITH SERIOUS COMORBIDITY AND BODY MASS INDEX (BMI) OF 39.0 TO 39.9 IN ADULT: ICD-10-CM

## 2024-04-12 DIAGNOSIS — K58.1 IRRITABLE BOWEL SYNDROME WITH CONSTIPATION: ICD-10-CM

## 2024-04-12 DIAGNOSIS — I10 ESSENTIAL HYPERTENSION: ICD-10-CM

## 2024-04-12 PROCEDURE — 99999 PR PBB SHADOW E&M-EST. PATIENT-LVL IV: CPT | Mod: PBBFAC,,, | Performed by: FAMILY MEDICINE

## 2024-04-12 PROCEDURE — 99396 PREV VISIT EST AGE 40-64: CPT | Mod: S$GLB,,, | Performed by: FAMILY MEDICINE

## 2024-04-12 PROCEDURE — 3079F DIAST BP 80-89 MM HG: CPT | Mod: CPTII,S$GLB,, | Performed by: FAMILY MEDICINE

## 2024-04-12 PROCEDURE — 1159F MED LIST DOCD IN RCRD: CPT | Mod: CPTII,S$GLB,, | Performed by: FAMILY MEDICINE

## 2024-04-12 PROCEDURE — 4010F ACE/ARB THERAPY RXD/TAKEN: CPT | Mod: CPTII,S$GLB,, | Performed by: FAMILY MEDICINE

## 2024-04-12 PROCEDURE — 3074F SYST BP LT 130 MM HG: CPT | Mod: CPTII,S$GLB,, | Performed by: FAMILY MEDICINE

## 2024-04-12 PROCEDURE — 3044F HG A1C LEVEL LT 7.0%: CPT | Mod: CPTII,S$GLB,, | Performed by: FAMILY MEDICINE

## 2024-04-12 PROCEDURE — 3008F BODY MASS INDEX DOCD: CPT | Mod: CPTII,S$GLB,, | Performed by: FAMILY MEDICINE

## 2024-04-12 PROCEDURE — 1160F RVW MEDS BY RX/DR IN RCRD: CPT | Mod: CPTII,S$GLB,, | Performed by: FAMILY MEDICINE

## 2024-04-12 RX ORDER — SEMAGLUTIDE 0.5 MG/.5ML
0.5 INJECTION, SOLUTION SUBCUTANEOUS
Qty: 2 ML | Refills: 0 | Status: SHIPPED | OUTPATIENT
Start: 2024-04-12 | End: 2024-05-03 | Stop reason: SDUPTHER

## 2024-04-12 RX ORDER — SEMAGLUTIDE 0.25 MG/.5ML
0.25 INJECTION, SOLUTION SUBCUTANEOUS
Qty: 2 ML | Refills: 0 | Status: SHIPPED | OUTPATIENT
Start: 2024-04-12 | End: 2024-04-19 | Stop reason: DRUGHIGH

## 2024-04-12 RX ORDER — SEMAGLUTIDE 1.7 MG/.75ML
1.7 INJECTION, SOLUTION SUBCUTANEOUS
Qty: 2 ML | Refills: 0 | Status: SHIPPED | OUTPATIENT
Start: 2024-04-12

## 2024-04-12 RX ORDER — SEMAGLUTIDE 1 MG/.5ML
1 INJECTION, SOLUTION SUBCUTANEOUS
Qty: 2 ML | Refills: 0 | Status: SHIPPED | OUTPATIENT
Start: 2024-04-12 | End: 2024-05-12

## 2024-04-12 NOTE — PROGRESS NOTES
"Subjective:         Patient ID: Karuna Sandoval is a 39 y.o. female.    Chief Complaint: Hypertension    Patient Active Problem List   Diagnosis    Thyromegaly    Essential hypertension    Microcytosis    Stage 3a chronic kidney disease    Class 2 severe obesity due to excess calories with serious comorbidity and body mass index (BMI) of 39.0 to 39.9 in adult    Research subject    Moderate episode of recurrent major depressive disorder    Irritable bowel syndrome with constipation    Prediabetes    Low back pain radiating to right lower extremity      HPI    Karuna is a 39 y.o. female who presents today for annual visit.   Taking BP meds. BP controlled.   Reports less stressors, overall stable.   Reports WFH with ongoing back pain and right hip and gluteal tightness, no groin pain.   Reviewed hip Xray 09/2023.     The 10-year ASCVD risk score (Marquise CALL, et al., 2019) is: 2.5%    Values used to calculate the score:      Age: 39 years      Sex: Female      Is Non- : Yes      Diabetic: No      Tobacco smoker: No      Systolic Blood Pressure: 118 mmHg      Is BP treated: Yes      HDL Cholesterol: 34 mg/dL      Total Cholesterol: 158 mg/dL    Review of Systems   All other systems reviewed and are negative.       Objective:     Vitals:    04/12/24 0934   BP: 118/80   BP Location: Left arm   Patient Position: Sitting   BP Method: Medium (Manual)   Pulse: (!) 112   SpO2: 95%   Weight: 112.3 kg (247 lb 9.2 oz)   Height: 5' 6" (1.676 m)         Physical Exam  Vitals and nursing note reviewed.   Constitutional:       General: She is not in acute distress.     Appearance: Normal appearance. She is not ill-appearing, toxic-appearing or diaphoretic.   HENT:      Head: Normocephalic and atraumatic.   Eyes:      General: No scleral icterus.     Conjunctiva/sclera: Conjunctivae normal.   Cardiovascular:      Rate and Rhythm: Normal rate.      Heart sounds: Normal heart sounds. No murmur " heard.  Pulmonary:      Effort: Pulmonary effort is normal. No respiratory distress.      Breath sounds: Normal breath sounds.   Abdominal:      General: Bowel sounds are normal.   Musculoskeletal:      Comments: Right upper gluteal and right lower back tightness.   Skin:     Coloration: Skin is not pale.   Neurological:      Mental Status: She is alert. Mental status is at baseline.   Psychiatric:         Attention and Perception: Attention and perception normal.         Mood and Affect: Mood and affect normal.         Speech: Speech normal.         Behavior: Behavior normal.         Cognition and Memory: Cognition and memory normal.         Judgment: Judgment normal.       Assessment:       1. Encounter for general adult medical examination with abnormal findings    2. Essential hypertension    3. Stage 3a chronic kidney disease    4. Prediabetes    5. Low back pain radiating to right lower extremity    6. Class 2 severe obesity due to excess calories with serious comorbidity and body mass index (BMI) of 39.0 to 39.9 in adult    7. Unable to lose weight    8. Irritable bowel syndrome with constipation    9. Moderate episode of recurrent major depressive disorder    10. Encounter for screening mammogram for breast cancer        Plan:   Recent relevant labs results reviewed with patient.         1. Encounter for general adult medical examination with abnormal findings  - Risk and age appropriate anticipatory guidance.  reviewed and updated. Recommendations discussed with patient as appropriate.     2. Essential hypertension  -     BASIC METABOLIC PANEL; Future; Expected date: 04/12/2024  -     Cancel: Renal Function Panel; Future; Expected date: 04/12/2024  -     semaglutide, weight loss, (WEGOVY) 0.25 mg/0.5 mL PnIj; Inject 0.25 mg into the skin every 7 days. Take x 4 weeks. Medically necessary due to worsening obesity, HTN, Prediabetes, metabolic syndrome.  Dispense: 2 mL; Refill: 0  -     semaglutide, weight  loss, (WEGOVY) 0.5 mg/0.5 mL PnIj; Inject 0.5 mg into the skin every 7 days. Take for weeks 5-8. Medically necessary due to worsening obesity, HTN, Prediabetes, metabolic syndrome.  Dispense: 2 mL; Refill: 0  -     semaglutide, weight loss, (WEGOVY) 1 mg/0.5 mL PnIj; Inject 1 mg into the skin every 7 days. Take for weeks 9-12. Medically necessary due to worsening obesity, HTN, Prediabetes, metabolic syndrome.  Dispense: 2 mL; Refill: 0  -     semaglutide, weight loss, (WEGOVY) 1.7 mg/0.75 mL PnIj; Inject 1.7 mg into the skin every 7 days. Take for weeks 13-16. Medically necessary due to worsening obesity, HTN, Prediabetes, metabolic syndrome.  Dispense: 2 mL; Refill: 0  - Chronic health condition is stable and controlled. Continue current medication regimen and relevant lifestyle modifications. Necessary medication refills addressed. Routine ongoing surveillance monitoring.     3. Stage 3a chronic kidney disease  -     BASIC METABOLIC PANEL; Future; Expected date: 04/12/2024  -     Cancel: Renal Function Panel; Future; Expected date: 04/12/2024  -     Comprehensive Metabolic Panel; Future; Expected date: 04/12/2024  Worsened in recent lab, recheck hydrated. Continue to work on weight and BP control    4. Prediabetes  -     Hemoglobin A1C; Future; Expected date: 04/12/2024  Increase noted with weight, treat weight.     5. Low back pain radiating to right lower extremity  Discussed PT. Work on weight loss and ergonomics    6. Class 2 severe obesity due to excess calories with serious comorbidity and body mass index (BMI) of 39.0 to 39.9 in adult  7. Unable to lose weight  -     semaglutide, weight loss, (WEGOVY) 0.25 mg/0.5 mL PnIj; Inject 0.25 mg into the skin every 7 days. Take x 4 weeks. Medically necessary due to worsening obesity, HTN, Prediabetes, metabolic syndrome.  Dispense: 2 mL; Refill: 0  -     semaglutide, weight loss, (WEGOVY) 0.5 mg/0.5 mL PnIj; Inject 0.5 mg into the skin every 7 days. Take for weeks  5-8. Medically necessary due to worsening obesity, HTN, Prediabetes, metabolic syndrome.  Dispense: 2 mL; Refill: 0  -     semaglutide, weight loss, (WEGOVY) 1 mg/0.5 mL PnIj; Inject 1 mg into the skin every 7 days. Take for weeks 9-12. Medically necessary due to worsening obesity, HTN, Prediabetes, metabolic syndrome.  Dispense: 2 mL; Refill: 0  -     semaglutide, weight loss, (WEGOVY) 1.7 mg/0.75 mL PnIj; Inject 1.7 mg into the skin every 7 days. Take for weeks 13-16. Medically necessary due to worsening obesity, HTN, Prediabetes, metabolic syndrome.  Dispense: 2 mL; Refill: 0  Lifestyle modifications. PA for GLP1a    8. Irritable bowel syndrome with constipation  Chronic, stable    9. Moderate episode of recurrent major depressive disorder  Chronic, stable. Appears improved from prior baseline    10. Encounter for screening mammogram for breast cancer  -     Mammo Digital Screening David w/ [facility]; Future; Expected date: 04/01/2025    Patient's questions answered. Plan reviewed with patient at the end of visit. Relevant precautions to chief complaint and reasons to seek further medical care or to contact the office sooner reviewed with patient.     Follow up in about 6 months (around 10/12/2024) for Hypertension Follow-up (prelabs).

## 2024-04-13 ENCOUNTER — LAB VISIT (OUTPATIENT)
Dept: LAB | Facility: HOSPITAL | Age: 39
End: 2024-04-13
Attending: FAMILY MEDICINE
Payer: COMMERCIAL

## 2024-04-13 DIAGNOSIS — I10 ESSENTIAL HYPERTENSION: ICD-10-CM

## 2024-04-13 DIAGNOSIS — N18.31 STAGE 3A CHRONIC KIDNEY DISEASE: ICD-10-CM

## 2024-04-13 LAB
ANION GAP SERPL CALC-SCNC: 9 MMOL/L (ref 8–16)
BUN SERPL-MCNC: 13 MG/DL (ref 6–20)
CALCIUM SERPL-MCNC: 9.2 MG/DL (ref 8.7–10.5)
CHLORIDE SERPL-SCNC: 102 MMOL/L (ref 95–110)
CO2 SERPL-SCNC: 27 MMOL/L (ref 23–29)
CREAT SERPL-MCNC: 1.3 MG/DL (ref 0.5–1.4)
EST. GFR  (NO RACE VARIABLE): 53 ML/MIN/1.73 M^2
GLUCOSE SERPL-MCNC: 97 MG/DL (ref 70–110)
POTASSIUM SERPL-SCNC: 4.1 MMOL/L (ref 3.5–5.1)
SODIUM SERPL-SCNC: 138 MMOL/L (ref 136–145)

## 2024-04-13 PROCEDURE — 36415 COLL VENOUS BLD VENIPUNCTURE: CPT | Mod: PO | Performed by: FAMILY MEDICINE

## 2024-04-13 PROCEDURE — 80048 BASIC METABOLIC PNL TOTAL CA: CPT | Performed by: FAMILY MEDICINE

## 2024-04-18 ENCOUNTER — PATIENT MESSAGE (OUTPATIENT)
Dept: FAMILY MEDICINE | Facility: CLINIC | Age: 39
End: 2024-04-18
Payer: COMMERCIAL

## 2024-04-18 DIAGNOSIS — R73.03 PREDIABETES: ICD-10-CM

## 2024-04-18 DIAGNOSIS — I10 ESSENTIAL HYPERTENSION: ICD-10-CM

## 2024-04-18 DIAGNOSIS — R63.8 UNABLE TO LOSE WEIGHT: ICD-10-CM

## 2024-04-18 DIAGNOSIS — E66.01 CLASS 2 SEVERE OBESITY DUE TO EXCESS CALORIES WITH SERIOUS COMORBIDITY AND BODY MASS INDEX (BMI) OF 39.0 TO 39.9 IN ADULT: ICD-10-CM

## 2024-04-18 NOTE — TELEPHONE ENCOUNTER
No care due was identified.  [facility] Embedded Care Due Messages. Reference number: [id number].   4/18/2024 1:53:55 PM CDT

## 2024-04-19 ENCOUNTER — PATIENT OUTREACH (OUTPATIENT)
Dept: RESEARCH | Facility: CLINIC | Age: 39
End: 2024-04-19
Payer: COMMERCIAL

## 2024-04-19 RX ORDER — SEMAGLUTIDE 0.25 MG/.5ML
0.25 INJECTION, SOLUTION SUBCUTANEOUS
Qty: 2 ML | Refills: 0 | OUTPATIENT
Start: 2024-04-19 | End: 2024-05-19

## 2024-04-19 NOTE — PROGRESS NOTES
04/19/2024  8:25 AM    Patient Name Karuna Sandoval   Appointment Department [facility] PROP WEIGHT MANAGEMENT  Visit Type Audio (Virtual visit)    Clinic Weights   Wt Readings from Last 3 Encounters:   04/12/24 0934 112.3 kg (247 lb 9.2 oz)   04/08/24 0744 102.1 kg (225 lb)   10/12/23 0711 102.1 kg (225 lb 1.4 oz)     Last Reported Weights No data was found      [facility] INTERVENTION CONTACT:   Method of contact:  Phone Call   or Participant-Initiated Contact?:  -initiated contact  Type of intervention Contact:  Scheduled Session  Did the participant attend session?: Yes    Was the patient called within 15 minutes of the scheduled appointment?:  Yes  Is this a make-up session?: No    Which session materials covered in session?:  Session 30  Patient Reported Weight (From External Bita):  242  Time workout: not specified.  Average Daily Steps: not specified.  Calorie Prescription::  2000  Safety Criteria Triggered:  None  Toolbox Triggered:  Adherence to diet  Adherence to diet: Health  must choose at least two interventions from list::  Problem solving with SMART goals and Use of motivational interviewing  Weight Graph Stoplight Status for Dietary Adherence:  Red Light       Goals    None          Additional Notes:    Monthly coaching call to patient. States that she is working with her PCP to discover any underlying conditions that may be affecting her weight loss. States that she has not changed her diet beyond what she was already doing and this weight gain is largely unexplained. Having thyroid levels tested. Still lactose-free and still having some digestive issues.    Motivated to lose weight to help with pain in her knees and hip. States that she may start a GLP-1 medication if her doctor approves her.       Goals:   1) Working from home days, walk in the neighborhood for exercise  2) Continuing to avoid known food triggers for digestion (lactose)    Tamika Carrasco M.Ed.  [employer]    [phone]

## 2024-04-19 NOTE — TELEPHONE ENCOUNTER
Refill Decision Note   Karuna Jaime  is requesting a refill authorization.  Brief Assessment and Rationale for Refill:  Quick Discontinue     Medication Therapy Plan:       Medication Reconciliation Completed: No   Comments:     No Care Gaps recommended.     Note composed:1:47 PM 04/19/2024

## 2024-04-29 DIAGNOSIS — R63.8 UNABLE TO LOSE WEIGHT: ICD-10-CM

## 2024-04-29 DIAGNOSIS — E66.01 CLASS 2 SEVERE OBESITY DUE TO EXCESS CALORIES WITH SERIOUS COMORBIDITY AND BODY MASS INDEX (BMI) OF 39.0 TO 39.9 IN ADULT: ICD-10-CM

## 2024-04-29 DIAGNOSIS — I10 ESSENTIAL HYPERTENSION: ICD-10-CM

## 2024-04-29 DIAGNOSIS — R73.03 PREDIABETES: ICD-10-CM

## 2024-04-29 NOTE — TELEPHONE ENCOUNTER
No care due was identified.  [facility] Embedded Care Due Messages. Reference number: [id number].   4/29/2024 8:42:25 AM CDT

## 2024-04-30 RX ORDER — SEMAGLUTIDE 0.5 MG/.5ML
0.5 INJECTION, SOLUTION SUBCUTANEOUS
Qty: 2 ML | Refills: 0 | OUTPATIENT
Start: 2024-04-30 | End: 2024-05-30

## 2024-04-30 NOTE — TELEPHONE ENCOUNTER
Refill Decision Note   Karuna Sandoval  is requesting a refill authorization.  Brief Assessment and Rationale for Refill:  Quick Discontinue     Medication Therapy Plan:  duplicate request      Comments:     Note composed:11:16 AM 04/30/2024

## 2024-05-03 DIAGNOSIS — R73.03 PREDIABETES: ICD-10-CM

## 2024-05-03 DIAGNOSIS — R63.8 UNABLE TO LOSE WEIGHT: ICD-10-CM

## 2024-05-03 DIAGNOSIS — I10 ESSENTIAL HYPERTENSION: ICD-10-CM

## 2024-05-03 DIAGNOSIS — E66.01 CLASS 2 SEVERE OBESITY DUE TO EXCESS CALORIES WITH SERIOUS COMORBIDITY AND BODY MASS INDEX (BMI) OF 39.0 TO 39.9 IN ADULT: ICD-10-CM

## 2024-05-03 RX ORDER — SEMAGLUTIDE 0.5 MG/.5ML
0.5 INJECTION, SOLUTION SUBCUTANEOUS
Qty: 2 ML | Refills: 0 | Status: SHIPPED | OUTPATIENT
Start: 2024-05-03 | End: 2024-06-02

## 2024-05-03 NOTE — TELEPHONE ENCOUNTER
Spoke to patient insurance pharmacy and patient. Medication needs a prior authorization and is waiting for the questions to be answer. Per chart the medication kept being sent to [facility] instead of [facility] for the prior authorization. Informed the patient that I will send the prescription to Dr Last for her to send to [facility] for the prior authorization. Once it gets approve then it can start getting filled at her pharmacy. Patient verbalized understanding.

## 2024-05-03 NOTE — TELEPHONE ENCOUNTER
No care due was identified.  [facility] Embedded Care Due Messages. Reference number: [id number].   5/03/2024 8:56:57 AM CDT

## 2024-05-17 ENCOUNTER — PATIENT OUTREACH (OUTPATIENT)
Dept: RESEARCH | Facility: CLINIC | Age: 39
End: 2024-05-17
Payer: COMMERCIAL

## 2024-05-17 NOTE — PROGRESS NOTES
05/17/2024  8:23 AM    Patient Name Karuna Sandoval   Appointment Department [facility] JOSE WEIGHT MANAGEMENT  Visit Type Audio (Virtual visit)    Clinic Weights   Wt Readings from Last 3 Encounters:   04/12/24 0934 112.3 kg (247 lb 9.2 oz)   04/08/24 0744 102.1 kg (225 lb)   10/12/23 0711 102.1 kg (225 lb 1.4 oz)     Last Reported Weights No data was found      [facility] INTERVENTION CONTACT:   Method of contact:  Phone Call   or Participant-Initiated Contact?:  -initiated contact  Type of intervention Contact:  Scheduled Session  Did the participant attend session?: Yes    Was the patient called within 15 minutes of the scheduled appointment?:  Yes  Is this a make-up session?: No    Which session materials covered in session?:  Session 31  Patient Reported Weight (From External Bita):  247  Time workout: not specified.  Average Daily Steps: not reported.  Calorie Prescription::  2000  Safety Criteria Triggered:  None  Toolbox Triggered:  Adherence to diet  Adherence to diet: Health  must choose at least two interventions from list::  Use of motivational interviewing and Problem solving with SMART goals  Weight Graph Stoplight Status for Dietary Adherence:  Red Light       Goals    None          Additional Notes:     Monthly coaching call to patient. States she actually fell and is back wearing a boot again. She can walk pretty easily but cannot stand for long periods of time (and cannot run obviously).     States that her son graduated and is moving away to college in a couple of months which she knows will help change her eating habits. He has a limited palate and food allergies that prohibit her from eating as healthily as she would like to.     Disappointed that she was denied for a GLP-1 drug by her insurance but is still determined to lose weight. She states that when she is losing weight a big part of her success is being active and exercising. Intends to try to walk more now since that is what  she can do with the boot.     Starting a new job on Monday that is a better job and she is excited about the opportunity!    Goal:   1) Walking as often and as much as possible without re-injury or discomfort    Tamika Carrasco [facility]   [phone]

## 2024-05-30 ENCOUNTER — PATIENT OUTREACH (OUTPATIENT)
Dept: RESEARCH | Facility: CLINIC | Age: 39
End: 2024-05-30
Payer: COMMERCIAL

## 2024-06-14 ENCOUNTER — PATIENT OUTREACH (OUTPATIENT)
Dept: RESEARCH | Facility: CLINIC | Age: 39
End: 2024-06-14
Payer: COMMERCIAL

## 2024-06-14 NOTE — PROGRESS NOTES
06/14/2024  8:08 AM    Patient Name Karuna Sandoval   Appointment Department [facility] PROP WEIGHT MANAGEMENT  Visit Type Audio (Virtual visit)    Clinic Weights   Wt Readings from Last 3 Encounters:   04/12/24 0934 112.3 kg (247 lb 9.2 oz)   04/08/24 0744 102.1 kg (225 lb)   10/12/23 0711 102.1 kg (225 lb 1.4 oz)     Last Reported Weights No data was found      [facility] INTERVENTION CONTACT:   Method of contact:  Phone Call   or Participant-Initiated Contact?:  -initiated contact  Type of intervention Contact:  Scheduled Session  Did the participant attend session?: Yes    Was the patient called within 15 minutes of the scheduled appointment?:  Yes  Is this a make-up session?: No    Which session materials covered in session?:  Session 32  Patient Reported Weight (From External Bita):  246  Time workout: not reported.  Average Daily Steps: not reported.  Calorie Prescription::  2000  Safety Criteria Triggered:  None  Toolbox Triggered:  Adherence to diet  Adherence to diet: Health  must choose at least two interventions from list::  Problem solving with SMART goals and Use of motivational interviewing  Weight Graph Stoplight Status for Dietary Adherence:  Red Light       Goals    None          Additional Notes:    Patient reports doing well overall and is enjoying her new job. She explained that hopefully she will start losing weight since she is less stressed overall and believes this was contributing to her weight gain over the last several months.     Is doing better with her foot/leg/hip. Not wearing a boot right now and has follow up appointment in 2 weeks.     Hoping to obtain GLP-1 through a virtual wellness program and intends to use this to lose weight before starting another exercise routine. She knows that exercising right now would be a lot of stress on her leg/hip/foot.     Did not discuss specific goals today - was more of an update/check-in.    Tamika Carrasco, [facility]  [employer] [facility]    [phone]

## 2024-06-18 DIAGNOSIS — I10 ESSENTIAL HYPERTENSION: ICD-10-CM

## 2024-06-18 RX ORDER — HYDROCHLOROTHIAZIDE 25 MG/1
TABLET ORAL
Qty: 90 TABLET | Refills: 3 | Status: SHIPPED | OUTPATIENT
Start: 2024-06-18

## 2024-06-18 RX ORDER — LISINOPRIL 40 MG/1
40 TABLET ORAL DAILY
Qty: 90 TABLET | Refills: 3 | Status: SHIPPED | OUTPATIENT
Start: 2024-06-18

## 2024-06-18 NOTE — TELEPHONE ENCOUNTER
Refill Decision Note   Karuna Sandoval  is requesting a refill authorization.  Brief Assessment and Rationale for Refill:  Approve     Medication Therapy Plan:         Comments:     Note composed:5:43 PM 06/18/2024

## 2024-06-18 NOTE — TELEPHONE ENCOUNTER
No care due was identified.  [facility] Embedded Care Due Messages. Reference number: [id number].   6/18/2024 3:18:57 PM CDT

## 2024-07-19 ENCOUNTER — PATIENT OUTREACH (OUTPATIENT)
Dept: RESEARCH | Facility: CLINIC | Age: 39
End: 2024-07-19
Payer: COMMERCIAL

## 2024-07-19 NOTE — PROGRESS NOTES
"07/19/2024  8:17 AM    Patient Name Karuna Sandoval   Appointment Department [facility] JOSE WEIGHT MANAGEMENT  Visit Type Audio (Virtual visit)    Clinic Weights   Wt Readings from Last 3 Encounters:   04/12/24 0934 112.3 kg (247 lb 9.2 oz)   04/08/24 0744 102.1 kg (225 lb)   10/12/23 0711 102.1 kg (225 lb 1.4 oz)     Last Reported Weights No data was found      [facility] INTERVENTION CONTACT:   Method of contact:  Phone Call   or Participant-Initiated Contact?:  -initiated contact  Type of intervention Contact:  Scheduled Session  Did the participant attend session?: Yes    Was the patient called within 15 minutes of the scheduled appointment?:  Yes  Is this a make-up session?: No    Which session materials covered in session?:  Session 33  Patient Reported Weight (From External Bita):  244  Time workout: not reported.  Average Daily Steps: not reported.  Calorie Prescription::  2000  Safety Criteria Triggered:  None  Toolbox Triggered:  Adherence to diet  Adherence to diet: Health  must choose at least two interventions from list::  Problem solving with SMART goals and Use of motivational interviewing  Weight Graph Stoplight Status for Dietary Adherence:  Red Light       Goals    None          Additional Notes:    Patient reports doing okay. 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     Is pleased to see her weight trending down - attributes to snacking less. Can feel it in her abdomen. Also states that her digestion has greatly improved with her overall stress reduction. States that some foods still hurt her stomach but not like what she was originally dealing with.     She is looking forward to taking some "me time" in the next month after getting her son moved away for college. Will take off 2 days from work to rest and relax.     Goals: "   1) Continue to follow 2,000 calorie meal plan  2)  for two days to take off work - dedicated for relaxation    Tamika Carrasco [facility]  [employer] [facility]   [phone]

## 2024-08-16 ENCOUNTER — PATIENT OUTREACH (OUTPATIENT)
Dept: RESEARCH | Facility: CLINIC | Age: 39
End: 2024-08-16
Payer: COMMERCIAL

## 2024-08-16 NOTE — PROGRESS NOTES
08/16/2024  8:07 AM    Patient Name Karuna Sandoval   Appointment Department [facility] PROP WEIGHT MANAGEMENT  Visit Type Audio (Virtual visit)    Clinic Weights   Wt Readings from Last 3 Encounters:   04/12/24 0934 112.3 kg (247 lb 9.2 oz)   04/08/24 0744 102.1 kg (225 lb)   10/12/23 0711 102.1 kg (225 lb 1.4 oz)     Last Reported Weights No data was found      [facility] INTERVENTION CONTACT:   Method of contact:  Phone Call   or Participant-Initiated Contact?:  -initiated contact  Type of intervention Contact:  Scheduled Session  Did the participant attend session?: Yes    Was the patient called within 15 minutes of the scheduled appointment?:  Yes  Is this a make-up session?: No    Which session materials covered in session?:  Session 34  Patient Reported Weight (From External Bita):  246  Time workout: not reported.  Average Daily Steps: not reported.  Calorie Prescription::  2000  Safety Criteria Triggered:  None  Toolbox Triggered:  Adherence to diet  Adherence to diet: Health  must choose at least two interventions from list::  Use of motivational interviewing and Problem solving with SMART goals  Weight Graph Stoplight Status for Dietary Adherence:  Red Light       Goals    None          Additional Notes:    Patient reports doing okay overall. She is out of town currently moving her son into college. She states that her stomach/digestion has been painful and attributes to eating meat. Going to eat more seafood and less meat this month (since son will be away - he is seafood allergic).     Will have more freetime this month coming up and goal is to workout twice/week at the gym.     Goals:   1) Substitute meat with seafood options  2) Exercising at the gym, twice/week    Tamika Carrasco [facility]  [employer] [facility]   [phone]

## 2024-09-12 ENCOUNTER — TELEPHONE (OUTPATIENT)
Dept: INTERNAL MEDICINE | Facility: CLINIC | Age: 39
End: 2024-09-12
Payer: COMMERCIAL

## 2024-09-12 NOTE — TELEPHONE ENCOUNTER
Patient states she was seen in the ER at [place] and told she probably has an infected gallbladder, and advised she have it taken out. Calling because she wants a referral to GI for a second opinion before she commits to surgery.

## 2024-09-12 NOTE — TELEPHONE ENCOUNTER
We'll need the imaging report she got at [place].   [facility] for US or CT whatever was done during that ER visit as well as ED doc's notes and labs.   We can't see Kevin's records unfortunately.  It would be general surgery if there's a referral.  If we can't the imaging records, then we'll have to order a repeat US.     She can send an evisit for imaging to be ordered or referral once records all available.

## 2024-09-20 ENCOUNTER — PATIENT OUTREACH (OUTPATIENT)
Dept: RESEARCH | Facility: CLINIC | Age: 39
End: 2024-09-20
Payer: COMMERCIAL

## 2024-09-20 NOTE — PROGRESS NOTES
09/20/2024  8:05 AM    Patient Name Karuna Sandoval   Appointment Department [facility] PROPEL WEIGHT MANAGEMENT  Visit Type Audio (Virtual visit)    Clinic Weights   Wt Readings from Last 3 Encounters:   04/12/24 0934 112.3 kg (247 lb 9.2 oz)   04/08/24 0744 102.1 kg (225 lb)   10/12/23 0711 102.1 kg (225 lb 1.4 oz)     Last Reported Weights No data was found      [facility] INTERVENTION CONTACT:   Method of contact:  Phone Call   or Participant-Initiated Contact?:  -initiated contact  Type of intervention Contact:  Scheduled Session  Did the participant attend session?: Yes    Was the patient called within 15 minutes of the scheduled appointment?:  Yes  Is this a make-up session?: No    Which session materials covered in session?:  Session 35  Patient reported first weight: last weight from August 2024, 246 lbs.  Time workout: not reported.  Average Daily Steps: not reported.  Calorie Prescription::  2000  Safety Criteria Triggered:  None  Toolbox Triggered:  Adherence to diet  Adherence to diet: Health  must choose at least two interventions from list::  Problem solving with SMART goals and Use of motivational interviewing  Weight Graph Stoplight Status for Dietary Adherence:  Red Light       Goals    None          Additional Notes:    Patient explained that over the last month she has been adjusting to her son being away at college. She also is still having severe stomach pain and went to the ED for this last week. She is waiting for a second opinion and suspects it is either IBS or an infected gallbladder.     At this time this is her most immediate health concern. Did not discuss weight loss in relation to these other concerns.     Tamika Carrasco, [facility]  [employer] [facility]   [phone]

## 2024-09-30 ENCOUNTER — OFFICE VISIT (OUTPATIENT)
Dept: GASTROENTEROLOGY | Facility: CLINIC | Age: 39
End: 2024-09-30
Payer: COMMERCIAL

## 2024-09-30 DIAGNOSIS — R10.84 GENERALIZED ABDOMINAL PAIN: Primary | ICD-10-CM

## 2024-09-30 DIAGNOSIS — K58.1 IRRITABLE BOWEL SYNDROME WITH CONSTIPATION: ICD-10-CM

## 2024-09-30 DIAGNOSIS — Z09 HOSPITAL DISCHARGE FOLLOW-UP: ICD-10-CM

## 2024-09-30 DIAGNOSIS — R93.5 ABNORMAL ABDOMINAL CT SCAN: ICD-10-CM

## 2024-09-30 PROCEDURE — 99214 OFFICE O/P EST MOD 30 MIN: CPT | Mod: 95,,,

## 2024-09-30 PROCEDURE — 1159F MED LIST DOCD IN RCRD: CPT | Mod: CPTII,95,,

## 2024-09-30 PROCEDURE — 3044F HG A1C LEVEL LT 7.0%: CPT | Mod: CPTII,95,,

## 2024-09-30 PROCEDURE — 1160F RVW MEDS BY RX/DR IN RCRD: CPT | Mod: CPTII,95,,

## 2024-09-30 PROCEDURE — 4010F ACE/ARB THERAPY RXD/TAKEN: CPT | Mod: CPTII,95,,

## 2024-09-30 RX ORDER — CEPHALEXIN 500 MG/1
500 CAPSULE ORAL 3 TIMES DAILY
COMMUNITY
Start: 2024-09-26

## 2024-09-30 RX ORDER — FAMOTIDINE 20 MG/1
20 TABLET, FILM COATED ORAL 2 TIMES DAILY
COMMUNITY

## 2024-09-30 RX ORDER — DICYCLOMINE HYDROCHLORIDE 10 MG/1
10 CAPSULE ORAL
COMMUNITY
Start: 2024-09-14

## 2024-09-30 NOTE — PROGRESS NOTES
The patient location is: home   The chief complaint leading to consultation is: abdominal pain, constipation     Visit type: audiovisual    Face to Face time with patient: 10  35 minutes of total time spent on the encounter, which includes face to face time and non-face to face time preparing to see the patient (eg, review of tests), Obtaining and/or reviewing separately obtained history, Documenting clinical information in the electronic or other health record, Independently interpreting results (not separately reported) and communicating results to the patient/family/caregiver, or Care coordination (not separately reported).     Each patient to whom he or she provides medical services by telemedicine is:  (1) informed of the relationship between the physician and patient and the respective role of any other health care provider with respect to management of the patient; and (2) notified that he or she may decline to receive medical services by telemedicine and may withdraw from such care at any time.       Gastroenterology Clinic Consultation Note    Reason for Visit:  The primary encounter diagnosis was Generalized abdominal pain. Diagnoses of Irritable bowel syndrome with constipation, Abnormal abdominal CT scan, and Hospital discharge follow-up were also pertinent to this visit.    PCP:   Blanca Last.         Initial HPI   This is a 39 y.o. female presenting for abdominal pain, constipation   Patient on virtual visit with complaints of intermittent abdominal pain since August. She describes the pain generalized in area, twisting, gripping 10/10 pain. The pain comes on lasts a few hours then subsides. She has experienced this every 2 weeks since it started. She does relate the timing to giving herself enemas. She reports she has IBS-C and she thought the pain was related since she had been undergoing increased stress at home but the pain continued. She was seen in the ED on 9/11 with similar complaints where a CT  "was done that showed "findings concerning for cholecystitis, although in the setting of an enlarged heart and pericholecystic fluid, volume status". She had already left the hospital when the results were given and no follow up testing was done. She denies any further pain in 3 weeks. She has started taking linzess for the constipation but reports it causes diarrhea so she does not take it daily. Denies unintentional weight loss, fever, chills, nausea, vomiting, esophageal reflux, regurgitation, hematemesis, difficulties swallowing, blood in stool, and melena. She reports having a normal colonoscopy at  6 years ago.       ROS:  Review of Systems   Constitutional:  Negative for chills, fever, malaise/fatigue and weight loss.   Respiratory:  Negative for shortness of breath.    Cardiovascular:  Negative for chest pain.   Gastrointestinal:  Positive for abdominal pain and constipation. Negative for blood in stool, diarrhea, heartburn, melena, nausea and vomiting.   Neurological:  Negative for dizziness and weakness.        Medical History:  has a past medical history of Hypertension.    Surgical History:  has a past surgical history that includes Hysterectomy and  section ().    Family History: family history includes Cancer in her maternal grandmother; Diabetes in her maternal grandmother; Hypertension in her mother; Stroke in her maternal grandmother..       Review of patient's allergies indicates:   Allergen Reactions    Hydromorphone Anaphylaxis and Shortness Of Breath    Latex, natural rubber Rash       Current Outpatient Medications on File Prior to Visit   Medication Sig Dispense Refill    cephALEXin (KEFLEX) 500 MG capsule Take 500 mg by mouth 3 (three) times daily.      dicyclomine (BENTYL) 10 MG capsule Take 10 mg by mouth.      famotidine (PEPCID) 20 MG tablet Take 20 mg by mouth 2 (two) times daily.      FLONASE ALLERGY RELIEF 50 mcg/actuation nasal spray 2 sprays (100 mcg total) by Each " Nostril route 2 (two) times daily. 16 g 1    hydroCHLOROthiazide (HYDRODIURIL) 25 MG tablet Take 1 tab po daily for high blood pressure 90 tablet 3    LINZESS 72 mcg Cap capsule Take 1 capsule (72 mcg total) by mouth once daily. 30 capsule 11    lisinopriL (PRINIVIL,ZESTRIL) 40 MG tablet Take 1 tablet (40 mg total) by mouth once daily. 90 tablet 3    methocarbamoL (ROBAXIN) 750 MG Tab Take 1,500 mg by mouth 3 (three) times daily as needed.      triamcinolone acetonide 0.1% (KENALOG) 0.1 % cream Apply topically 2 (two) times daily. 45 g 0    loratadine (CLARITIN) 10 mg tablet Take 1 tablet (10 mg total) by mouth once daily. 90 tablet 3    [DISCONTINUED] semaglutide, weight loss, (WEGOVY) 1.7 mg/0.75 mL PnIj Inject 1.7 mg into the skin every 7 days. Take for weeks 13-16. Medically necessary due to worsening obesity, HTN, Prediabetes, metabolic syndrome. 2 mL 0     No current facility-administered medications on file prior to visit.         Objective Findings:    Vital Signs:  There were no vitals taken for this visit.  There is no height or weight on file to calculate BMI.    Physical Exam:  Physical Exam  Constitutional:       General: She is not in acute distress.     Appearance: Normal appearance. She is not ill-appearing.   HENT:      Head: Normocephalic and atraumatic.   Eyes:      Extraocular Movements: Extraocular movements intact.   Pulmonary:      Effort: Pulmonary effort is normal. No respiratory distress.   Musculoskeletal:      Cervical back: Normal range of motion.   Neurological:      Mental Status: She is alert and oriented to person, place, and time.             Labs:  Lab Results   Component Value Date    WBC 8.58 04/09/2024    HGB 12.6 04/09/2024    HCT 40.9 04/09/2024     04/09/2024    CHOL 158 04/09/2024    TRIG 131 04/09/2024    HDL 34 (L) 04/09/2024    ALKPHOS 40 (L) 09/26/2023    LIPASE 75 09/11/2024    ALT 16 09/26/2023    AST 18 09/26/2023     04/13/2024    K 4.1 04/13/2024    CL  102 04/13/2024    CREATININE 1.3 04/13/2024    BUN 13 04/13/2024    CO2 27 04/13/2024    TSH 1.290 09/26/2023    HGBA1C 5.9 (H) 04/09/2024       Imaging reviewed: 9/11/2024   FINDINGS:   Soft tissues: No acute soft tissue abnormalities.   Vessels: The IVC is normal.  The aorta tapers down without aneurysmal dilation.   Lower chest: The heart is mildly enlarged with a small pericardial effusion. The lung bases have bilateral dependent atelectasis.   GI Tract: The gastrointestinal tract is unremarkable.  The appendix is normal.   Liver: The liver is normal in contour. 2.5 cm left hepatic cyst.   Gallbladder/Bile Ducts: No intrahepatic or extrahepatic biliary ductal dilatation is seen. The gallbladder has wall thickening and pericholecystic fluid.   Pancreas: The pancreas is normal in morphology.   Spleen: The spleen is appears mildly enlarged and contains calcifications consistent with granulomatous disease.Small splenules are noted.   Adrenals: The adrenal glands demonstrate normal morphology and size.   Kidneys/Ureters: No hydronephrosis or hydroureter is identified. No focal lesions or renal stones are identified.   Bladder: Mildly distended bladder.   Reproductive Organs: Postsurgical changes of hysterectomy with air in the vault.   Peritoneum: No free air or free fluid is seen in the abdomen or pelvis.   Lymph nodes: No lymphadenopathy using size criteria.   Bones: Normal    1.   Findings are concerning for cholecystitis, although in the setting of an enlarged heart and pericholecystic fluid, volume status may also be considered. This was discussed with Dr. Kapadia by telephone prior to the finalization of this report.     Endoscopy reviewed: done at        Assessment:  1. Generalized abdominal pain    2. Irritable bowel syndrome with constipation    3. Abnormal abdominal CT scan    4. Hospital discharge follow-up      Orders Placed This Encounter    US Abdomen Limited (GALLBLADDER)       Plan:  US gallbladder to  further workup CT findings -- consider gen sx referral based on results  2.   Continue Linzess for constipation      Plan Constipation:   Start daily fiber. Take 1 tsp of fiber powder (psyllium or other sugar-free powder).  Mix in 8 oz of water.  Take x 3-5 days.  Then, increase fiber by 1 tsp every 3-5 days until stool is easy to pass.  Stop and continue at that dose.   Do not exceed 6 tsps/day. GOAL:  More well-formed stool (one continuous well-formed piece vs. Pellets) and minimize straining with initiation. Can cause increased gas.   Start miralax daily (17g per dose). Start at once per day and can titrate up to twice daily. Decrease and/or stop Miralax if stools become softer/liquid in consistency.   Increase water intake to 8-10 glasses of water per day  Stay active. 30-45 minutes of moderate activity 3-4 times per week. (Mobility=motility)       Thank you for allowing me to participate in this patient's care.    Sincerely,     JONES ABRAHAM-C  Gastroenterology Department  [facility] Office [phone]

## 2024-09-30 NOTE — PROGRESS NOTES
GENERAL GI PATIENT INTAKE:    COVID symptoms in the last 7 days (runny nose, sore throat, congestion, cough, fever): No  PCP: Blanca Last  If not PCP-  number given to establish [phone]: N/A    ALLERGIES REVIEWED:  Yes    CHIEF COMPLAINT:    Chief Complaint   Patient presents with    Abdominal Pain       VITAL SIGNS:  There were no vitals taken for this visit.     Change in medical, surgical, family or social history:  (reviewed by NP)      REVIEWED MEDICATION LIST RECONCILED INCLUDING ABOVE MEDS:  Yes

## 2024-10-04 ENCOUNTER — HOSPITAL ENCOUNTER (OUTPATIENT)
Dept: RADIOLOGY | Facility: HOSPITAL | Age: 39
Discharge: HOME | End: 2024-10-04
Payer: COMMERCIAL

## 2024-10-04 DIAGNOSIS — R10.84 GENERALIZED ABDOMINAL PAIN: ICD-10-CM

## 2024-10-04 DIAGNOSIS — Z09 HOSPITAL DISCHARGE FOLLOW-UP: ICD-10-CM

## 2024-10-04 DIAGNOSIS — K58.1 IRRITABLE BOWEL SYNDROME WITH CONSTIPATION: ICD-10-CM

## 2024-10-04 DIAGNOSIS — R93.5 ABNORMAL ABDOMINAL CT SCAN: ICD-10-CM

## 2024-10-04 PROCEDURE — 76705 ECHO EXAM OF ABDOMEN: CPT | Mod: TC

## 2024-10-04 PROCEDURE — 76705 ECHO EXAM OF ABDOMEN: CPT | Mod: 26,,, | Performed by: RADIOLOGY

## 2024-10-11 ENCOUNTER — LAB VISIT (OUTPATIENT)
Dept: LAB | Facility: HOSPITAL | Age: 39
End: 2024-10-11
Attending: FAMILY MEDICINE
Payer: COMMERCIAL

## 2024-10-11 DIAGNOSIS — N18.31 STAGE 3A CHRONIC KIDNEY DISEASE: ICD-10-CM

## 2024-10-11 DIAGNOSIS — R73.03 PREDIABETES: ICD-10-CM

## 2024-10-11 LAB
ALBUMIN SERPL BCP-MCNC: 3.9 G/DL (ref 3.5–5.2)
ALP SERPL-CCNC: 47 U/L (ref 55–135)
ALT SERPL W/O P-5'-P-CCNC: 24 U/L (ref 10–44)
ANION GAP SERPL CALC-SCNC: 12 MMOL/L (ref 8–16)
AST SERPL-CCNC: 22 U/L (ref 10–40)
BILIRUB SERPL-MCNC: 0.4 MG/DL (ref 0.1–1)
BUN SERPL-MCNC: 13 MG/DL (ref 6–20)
CALCIUM SERPL-MCNC: 9.5 MG/DL (ref 8.7–10.5)
CHLORIDE SERPL-SCNC: 104 MMOL/L (ref 95–110)
CO2 SERPL-SCNC: 24 MMOL/L (ref 23–29)
CREAT SERPL-MCNC: 1.4 MG/DL (ref 0.5–1.4)
EST. GFR  (NO RACE VARIABLE): 48.5 ML/MIN/1.73 M^2
ESTIMATED AVG GLUCOSE: 123 MG/DL (ref 68–131)
GLUCOSE SERPL-MCNC: 104 MG/DL (ref 70–110)
HBA1C MFR BLD: 5.9 % (ref 4–5.6)
POTASSIUM SERPL-SCNC: 3.9 MMOL/L (ref 3.5–5.1)
PROT SERPL-MCNC: 7.2 G/DL (ref 6–8.4)
SODIUM SERPL-SCNC: 140 MMOL/L (ref 136–145)

## 2024-10-11 PROCEDURE — 83036 HEMOGLOBIN GLYCOSYLATED A1C: CPT | Performed by: FAMILY MEDICINE

## 2024-10-11 PROCEDURE — 80053 COMPREHEN METABOLIC PANEL: CPT | Performed by: FAMILY MEDICINE

## 2024-10-11 PROCEDURE — 36415 COLL VENOUS BLD VENIPUNCTURE: CPT | Mod: PO | Performed by: FAMILY MEDICINE

## 2024-10-15 ENCOUNTER — OFFICE VISIT (OUTPATIENT)
Dept: FAMILY MEDICINE | Facility: CLINIC | Age: 39
End: 2024-10-15
Payer: COMMERCIAL

## 2024-10-15 ENCOUNTER — LAB VISIT (OUTPATIENT)
Dept: LAB | Facility: HOSPITAL | Age: 39
End: 2024-10-15
Attending: FAMILY MEDICINE
Payer: COMMERCIAL

## 2024-10-15 VITALS
HEIGHT: 66 IN | HEART RATE: 88 BPM | OXYGEN SATURATION: 98 % | DIASTOLIC BLOOD PRESSURE: 76 MMHG | BODY MASS INDEX: 40.96 KG/M2 | SYSTOLIC BLOOD PRESSURE: 110 MMHG | WEIGHT: 254.88 LBS

## 2024-10-15 DIAGNOSIS — E66.01 CLASS 3 SEVERE OBESITY DUE TO EXCESS CALORIES WITH SERIOUS COMORBIDITY AND BODY MASS INDEX (BMI) OF 40.0 TO 44.9 IN ADULT: ICD-10-CM

## 2024-10-15 DIAGNOSIS — M79.604 LOW BACK PAIN RADIATING TO RIGHT LOWER EXTREMITY: ICD-10-CM

## 2024-10-15 DIAGNOSIS — K58.1 IRRITABLE BOWEL SYNDROME WITH CONSTIPATION: ICD-10-CM

## 2024-10-15 DIAGNOSIS — F33.1 MODERATE EPISODE OF RECURRENT MAJOR DEPRESSIVE DISORDER: ICD-10-CM

## 2024-10-15 DIAGNOSIS — M54.50 LOW BACK PAIN RADIATING TO RIGHT LOWER EXTREMITY: ICD-10-CM

## 2024-10-15 DIAGNOSIS — R73.03 PREDIABETES: ICD-10-CM

## 2024-10-15 DIAGNOSIS — M17.0 BILATERAL PRIMARY OSTEOARTHRITIS OF KNEE: ICD-10-CM

## 2024-10-15 DIAGNOSIS — Z12.31 ENCOUNTER FOR SCREENING MAMMOGRAM FOR BREAST CANCER: ICD-10-CM

## 2024-10-15 DIAGNOSIS — I10 ESSENTIAL HYPERTENSION: Primary | ICD-10-CM

## 2024-10-15 DIAGNOSIS — N18.31 STAGE 3A CHRONIC KIDNEY DISEASE: ICD-10-CM

## 2024-10-15 DIAGNOSIS — F51.01 PRIMARY INSOMNIA: ICD-10-CM

## 2024-10-15 DIAGNOSIS — E66.813 CLASS 3 SEVERE OBESITY DUE TO EXCESS CALORIES WITH SERIOUS COMORBIDITY AND BODY MASS INDEX (BMI) OF 40.0 TO 44.9 IN ADULT: ICD-10-CM

## 2024-10-15 PROCEDURE — 99999 PR PBB SHADOW E&M-EST. PATIENT-LVL IV: CPT | Mod: PBBFAC,,, | Performed by: FAMILY MEDICINE

## 2024-10-15 PROCEDURE — 4010F ACE/ARB THERAPY RXD/TAKEN: CPT | Mod: CPTII,S$GLB,, | Performed by: FAMILY MEDICINE

## 2024-10-15 PROCEDURE — 3044F HG A1C LEVEL LT 7.0%: CPT | Mod: CPTII,S$GLB,, | Performed by: FAMILY MEDICINE

## 2024-10-15 PROCEDURE — 3078F DIAST BP <80 MM HG: CPT | Mod: CPTII,S$GLB,, | Performed by: FAMILY MEDICINE

## 2024-10-15 PROCEDURE — 3008F BODY MASS INDEX DOCD: CPT | Mod: CPTII,S$GLB,, | Performed by: FAMILY MEDICINE

## 2024-10-15 PROCEDURE — 82043 UR ALBUMIN QUANTITATIVE: CPT | Performed by: FAMILY MEDICINE

## 2024-10-15 PROCEDURE — 82570 ASSAY OF URINE CREATININE: CPT | Performed by: FAMILY MEDICINE

## 2024-10-15 PROCEDURE — 1160F RVW MEDS BY RX/DR IN RCRD: CPT | Mod: CPTII,S$GLB,, | Performed by: FAMILY MEDICINE

## 2024-10-15 PROCEDURE — 99214 OFFICE O/P EST MOD 30 MIN: CPT | Mod: S$GLB,,, | Performed by: FAMILY MEDICINE

## 2024-10-15 PROCEDURE — 3074F SYST BP LT 130 MM HG: CPT | Mod: CPTII,S$GLB,, | Performed by: FAMILY MEDICINE

## 2024-10-15 PROCEDURE — 1159F MED LIST DOCD IN RCRD: CPT | Mod: CPTII,S$GLB,, | Performed by: FAMILY MEDICINE

## 2024-10-15 RX ORDER — LISINOPRIL 40 MG/1
40 TABLET ORAL DAILY
Qty: 90 TABLET | Refills: 3 | Status: SHIPPED | OUTPATIENT
Start: 2024-10-15

## 2024-10-15 RX ORDER — METFORMIN HYDROCHLORIDE 500 MG/1
500 TABLET, EXTENDED RELEASE ORAL 2 TIMES DAILY WITH MEALS
Qty: 180 TABLET | Refills: 3 | Status: SHIPPED | OUTPATIENT
Start: 2024-10-15

## 2024-10-15 RX ORDER — HYDROCHLOROTHIAZIDE 25 MG/1
TABLET ORAL
Qty: 90 TABLET | Refills: 3 | Status: SHIPPED | OUTPATIENT
Start: 2024-10-15

## 2024-10-15 NOTE — PROGRESS NOTES
Subjective:         Patient ID: Karuna Sandoval is a 39 y.o. female.    Chief Complaint: Follow-up    Patient Active Problem List   Diagnosis    Thyromegaly    Essential hypertension    Microcytosis    Stage 3a chronic kidney disease    Class 3 severe obesity due to excess calories with serious comorbidity and body mass index (BMI) of 40.0 to 44.9 in adult    Research subject    Moderate episode of recurrent major depressive disorder    Irritable bowel syndrome with constipation    Prediabetes    Low back pain radiating to right lower extremity      HPI    Karuna is a 39 y.o. female    History of Present Illness    CHIEF COMPLAINT:  Karuna presents today for follow up.    CHRONIC KIDNEY DISEASE:  She has stage 3 chronic kidney disease with stable kidney function. She is advised to avoid kidney stressors, including heavy drinking and drug use, and to limit use of ibuprofen and similar medications, favoring acetaminophen for pain relief when needed.    PREDIABETES:  Her prediabetes status remains stable with A1C level not significantly changed since the last check-up.    GASTROINTESTINAL ISSUES:  Seen GI  She reports ongoing IBS symptoms with no improvement. Linzess was prescribed but causes severe diarrhea as a side effect.     MUSCULOSKELETAL PAIN:  She reports ongoing joint pain. Back pain remains unchanged, while hip pain has improved. She experiences bilateral knee pain, right worse than left, which she attributes to her weight. She notes difficulty with physical activities, stating she can only ride a bike and is unable to engage in long walks or running. Knee pain is the primary reason for her use of OTC pain medication.    SLEEP ISSUES:  She reports longstanding difficulty sleeping without apparent stressors. She denies knowing the cause of her sleep issues.    ANKLE INJURY:  She reports an ankle wound from a metal gate injury. Initially concerned about healing, upon exam she acknowledges the wound's  "appearance is actually "pretty good." She denies any foot swelling.            Objective:     Vitals:    10/15/24 1038   BP: 110/76   Pulse: 88   SpO2: 98%   Weight: 115.6 kg (254 lb 13.6 oz)   Height: 5' 6" (1.676 m)         Physical Exam    Vitals: Blood pressure well controlled.   No m/r/g  No acute distress    Assessment:       1. Essential hypertension    2. Stage 3a chronic kidney disease    3. Prediabetes    4. Moderate episode of recurrent major depressive disorder    5. Class 3 severe obesity due to excess calories with serious comorbidity and body mass index (BMI) of 40.0 to 44.9 in adult    6. Irritable bowel syndrome with constipation    7. Low back pain radiating to right lower extremity    8. Encounter for screening mammogram for breast cancer    9. Bilateral primary osteoarthritis of knee    10. Primary insomnia          Plan:   Recent relevant labs results reviewed with patient.         Assessment & Plan    Assessed chronic kidney disease stage 3, noting stable kidney function  Evaluated prediabetes status, finding A1C unchanged  Reviewed gallbladder ultrasound results, noting single gallstone without acute cholecystitis  Considered options for weight loss management, including metformin and potentially Wellbutrin. Wegovy was not covered by insurance.   Assessed sleep issues, recommending OTC options before considering prescription medications  Evaluated ankle injury, determining no infection present    CHRONIC KIDNEY DISEASE:  - Explained chronic kidney disease stage 3 prognosis, noting most people live for decades at this stage.  - Karuna to avoid heavy drinking and recreational drugs to protect kidney function.  - Urine test ordered to check kidney protein filtration.    GALLSTONES:  - Discussed gallstone implications, explaining they usually do not cause issues unless obstructive.    MEDICATIONS/SUPPLEMENTS:  - Provided information on metformin's potential side effects, primarily GI.  - " Explained Wellbutrin's uses for weight loss, smoking cessation, and mild depression. Can sent E visit to add in 3 months if desires.  Can consider Adipex if no weight loss, and blood pressure remains well-controlled.    - Started metformin ER: Begin with 1 pill at night for the first few weeks, then increase to 1 pill twice daily as tolerated.  - Continued current blood pressure medications with refills provided.    INSOMNIA:  - Educated on sleep hygiene practices and cognitive behavioral therapy for insomnia.  - Recommend implementing sleep hygiene practices: sleep meditations, winding down, turning off phone, cold room, only sleeping in bed  - Karuna to wake up at the same time every day, including weekends.  - Recommend considering use of the Sleep CBT jamil for cognitive behavioral therapy for insomnia.  - Recommend OTC sleep aids: Tylenol PM (preferred over Advil PM due to kidney condition), Unisom, Chlorpheniramine (yellow pill in allergy aisle) if Tylenol PM is ineffective.    WEIGHT MANAGEMENT:  - Referred to [employer] program for weight management, including access to gym, nutritionist, and structured program.    THYROID DISORDER:  - Thyroid function test ordered for next lab visit.    OTHER INSTRUCTIONS:  - Karuna to explore activities that provide energy and recharge, potentially finding new hobbies.    FOLLOW UP:  - Follow up in 6 months.  - Contact office via e-visit in 3 months to report on metformin progress and potentially add Wellbutrin.      Patient's questions answered. Plan reviewed with patient at the end of visit. Relevant precautions to chief complaint and reasons to seek further medical care or to contact the office sooner reviewed with patient.     Follow up in about 6 months (around 4/15/2025) for Annual Exam, (prelabs).        Part of this note was dictated using voice recognition software. Please excuse any typographical errors.     This note was generated with the assistance of [employer]  listening technology. Verbal consent was obtained by the patient and accompanying visitor(s) for the recording of patient appointment to facilitate this note. I attest to having reviewed and edited the generated note for accuracy, though some syntax or spelling errors may persist. Please contact the author of this note for any clarification.

## 2024-10-16 LAB
ALBUMIN/CREAT UR: NORMAL UG/MG (ref 0–30)
CREAT UR-MCNC: 108 MG/DL (ref 15–325)
MICROALBUMIN UR DL<=1MG/L-MCNC: <5 UG/ML

## 2024-10-18 ENCOUNTER — PATIENT OUTREACH (OUTPATIENT)
Dept: RESEARCH | Facility: CLINIC | Age: 39
End: 2024-10-18
Payer: COMMERCIAL

## 2024-10-18 NOTE — PROGRESS NOTES
10/18/2024  8:22 AM    Patient Name Karuna Sandoval   Appointment Department [facility]  Visit Type Audio (Virtual visit)    Clinic Weights   Wt Readings from Last 3 Encounters:   10/15/24 1038 115.6 kg (254 lb 13.6 oz)   04/12/24 0934 112.3 kg (247 lb 9.2 oz)   04/08/24 0744 102.1 kg (225 lb)     Last Reported Weights No data was found      [facility] INTERVENTION CONTACT:   Method of contact:  Phone Call   or Participant-Initiated Contact?:  -initiated contact  Type of intervention Contact:  Scheduled Session  Did the participant attend session?: Yes    Was the patient called within 15 minutes of the scheduled appointment?:  Yes  Is this a make-up session?: No    Which session materials covered in session?:  Session 36  Patient Reported Weight (From External Bita):  238  Time workout: not reported.  Average Daily Steps: not reported.  Calorie Prescription::  2000  Safety Criteria Triggered:  None  Toolbox Triggered:  Adherence to diet  Adherence to diet: Health  must choose at least two interventions from list::  Reduce portion size and Direct dietary modification based on individual needs  Weight Graph Stoplight Status for Dietary Adherence:  Red Light       Goals    None          Additional Notes:    Patient reports feeling better than last month with digestive/stomach concerns. She has entirely cut red meat & tomatoes from her diet since those are triggers. She was told this is a more severe form of the IBS she has had for years. She also has pain in her knees which is keeping her from being as active as she would like to be. States that recent weight loss reflected on scale is suspicious and she does not feel like she's actually lost weight. Recently started taking Metformin since she was not approved for semaglutide.     Discussed fear of water but how she would like to start swim lessons. She [place] get into the water. Ready to  try lessons again and feels confident that this is the right teacher for her.    Goal:   1) Schedule first swim lessons w/new teacher    Tamika Carrasco, [facility]  [facility]   [phone]

## 2024-10-22 ENCOUNTER — PATIENT MESSAGE (OUTPATIENT)
Dept: GASTROENTEROLOGY | Facility: CLINIC | Age: 39
End: 2024-10-22
Payer: COMMERCIAL

## 2024-11-15 ENCOUNTER — PATIENT OUTREACH (OUTPATIENT)
Dept: RESEARCH | Facility: CLINIC | Age: 39
End: 2024-11-15
Payer: COMMERCIAL

## 2024-11-15 NOTE — PROGRESS NOTES
11/15/2024  9:12 AM    Patient Name Karuna Sandoval   Appointment Department [facility]  Visit Type Audio (Virtual visit)    Clinic Weights   Wt Readings from Last 3 Encounters:   10/15/24 1038 115.6 kg (254 lb 13.6 oz)   04/12/24 0934 112.3 kg (247 lb 9.2 oz)   04/08/24 0744 102.1 kg (225 lb)     Last Reported Weights No data was found      [facility] INTERVENTION CONTACT:   Method of contact:  Phone Call   or Participant-Initiated Contact?:  -initiated contact  Type of intervention Contact:  Scheduled Session  Did the participant attend session?: Yes    Was the patient called within 15 minutes of the scheduled appointment?:  Yes  Is this a make-up session?: No    Which session materials covered in session?:  Session 37  Patient Reported Weight (From External Bita):  250  Time workout: not reported.  Average Daily Steps: not reported.  Calorie Prescription::  2000  Safety Criteria Triggered:  None  Toolbox Triggered:  Adherence to diet  Adherence to diet: Health  must choose at least two interventions from list::  Other (please comment) and Use of motivational interviewing (sick for the last 3 weeks, eating less overall)  Weight Graph Stoplight Status for Dietary Adherence:  Red Light       Goals    None          Additional Notes:    Patient is sick today (estimated virus or URI). States that she has lost ~3 lbs over the last few weeks due to not eating as much being sick.     Did not discuss goals for this month. Patient is still taking metformin but does not notice a difference yet since she has been sick.     Tamika Carrasco, [facility]  [facility]   [phone]

## 2024-11-19 ENCOUNTER — OFFICE VISIT (OUTPATIENT)
Dept: OTOLARYNGOLOGY | Facility: CLINIC | Age: 39
End: 2024-11-19
Payer: COMMERCIAL

## 2024-11-19 DIAGNOSIS — K21.9 LARYNGOPHARYNGEAL REFLUX (LPR): ICD-10-CM

## 2024-11-19 DIAGNOSIS — R09.82 POST-NASAL DRIP: Primary | ICD-10-CM

## 2024-11-19 PROCEDURE — 99203 OFFICE O/P NEW LOW 30 MIN: CPT | Mod: S$GLB,,, | Performed by: PHYSICIAN ASSISTANT

## 2024-11-19 PROCEDURE — 99999 PR PBB SHADOW E&M-EST. PATIENT-LVL II: CPT | Mod: PBBFAC,,, | Performed by: PHYSICIAN ASSISTANT

## 2024-11-19 PROCEDURE — 1159F MED LIST DOCD IN RCRD: CPT | Mod: CPTII,S$GLB,, | Performed by: PHYSICIAN ASSISTANT

## 2024-11-19 PROCEDURE — 3066F NEPHROPATHY DOC TX: CPT | Mod: CPTII,S$GLB,, | Performed by: PHYSICIAN ASSISTANT

## 2024-11-19 PROCEDURE — 4010F ACE/ARB THERAPY RXD/TAKEN: CPT | Mod: CPTII,S$GLB,, | Performed by: PHYSICIAN ASSISTANT

## 2024-11-19 PROCEDURE — 3044F HG A1C LEVEL LT 7.0%: CPT | Mod: CPTII,S$GLB,, | Performed by: PHYSICIAN ASSISTANT

## 2024-11-19 PROCEDURE — 3061F NEG MICROALBUMINURIA REV: CPT | Mod: CPTII,S$GLB,, | Performed by: PHYSICIAN ASSISTANT

## 2024-11-19 PROCEDURE — 1160F RVW MEDS BY RX/DR IN RCRD: CPT | Mod: CPTII,S$GLB,, | Performed by: PHYSICIAN ASSISTANT

## 2024-11-19 NOTE — PROGRESS NOTES
"No chief complaint on file.        39 y.o. female presents for evaluation of persistent post nasal drip. About a month ago - she got sick (chills, myalgias, fatigue, dry cough) - she went to urgent care, was tested for Flu, COVID, Strep and all were negative. She was given a prescription for ?doxycycline which she completed with no improvement. She went back to urgent care - they gave her azithromycin and steroid. She does not feel "sick" anymore but continues with persistent clear post nasal drip, nasal drainage. This is causing her to cough and then gag. Symptoms are worse at night when lying flat and when she first wakes up in the morning. She does use Flonase PRN, takes claritin twice a day. She does not use sinus rinses. She does have a prescription for pepcid but takes this only as needed and has not been taking it.  She denies dysphagia, globus sensation, sore throat, ear pain, lymphadenopathy.   Uses flonase PRN. Takes claritin twice a day. Does not do sinus rinses.     No PSHx head/neck.  No history of radiation therapy.  Never smoker.     Review of Systems     Constitutional: Negative for fatigue and unexpected weight change.   HENT: Per HPI  Eyes: Negative for visual disturbance.   Respiratory: Negative for shortness of breath, hemoptysis  Cardiovascular: Negative for chest pain and palpitations.   Musculoskeletal: Negative for decreased ROM, back pain.       Past Medical History:   Diagnosis Date    Hypertension        Past Surgical History:   Procedure Laterality Date     SECTION      HYSTERECTOMY         family history includes Cancer in her maternal grandmother; Diabetes in her maternal grandmother; Hypertension in her mother; Stroke in her maternal grandmother.    Pt  reports that she has never smoked. She has never been exposed to tobacco smoke. She has never used smokeless tobacco. She reports current alcohol use of about 1.0 standard drink of alcohol per week. She reports that she does " not use drugs.    Review of patient's allergies indicates:   Allergen Reactions    Hydromorphone Anaphylaxis and Shortness Of Breath    Latex, natural rubber Rash        Physical Exam    There were no vitals filed for this visit.  There is no height or weight on file to calculate BMI.    General: AOx3, NAD  Right Ear: External Auditory Canal WNL,TM w/o masses/lesions/perforations  Left Ear:  External Auditory Canal WNL,TM w/o masses/lesions/perforations  Nose: No gross nasal septal deviation.  Inferior Turbinates WNL bilaterally.  No septal perforation.  No masses/lesions. No obvious drainage. No sinus tenderness.  Oral Cavity: FOM Soft, no masses palpated.  Oral Tongue mobile.  Hard Palate WNL.  Oropharynx: BOT WNL.  No masses/lesions noted.  Tonsillar fossa without lesions.  Soft palate without masses.  Midline uvula.  Neck: No palpable lymphadenopathy at I - VI.    Face: House Brackmann I bilaterally.  Eyes: Normal extra ocular motion bilaterally.      Assessment     1. Post-nasal drip    2. Laryngopharyngeal reflux (LPR)          Plan    Problem List Items Addressed This Visit    None  Visit Diagnoses       Post-nasal drip    -  Primary    Laryngopharyngeal reflux (LPR)              Clear rhinorrhea and post nasal drip. No sinus tenderness. Symptoms worse at night when she lies down and in the morning when she first works up. No signs of bacterial sinusitis on exam today. She has completed azithromycin and doxycycline within the past month.   Suspect there is a component of LPR. Recommend taking prescribed pepcid. Flonase daily. Daily sinus rinses.   Continue loratadine.  All questions answered. Follow up PRN.

## 2024-12-17 ENCOUNTER — OFFICE VISIT (OUTPATIENT)
Dept: SPORTS MEDICINE | Facility: CLINIC | Age: 39
End: 2024-12-17
Payer: COMMERCIAL

## 2024-12-17 ENCOUNTER — HOSPITAL ENCOUNTER (OUTPATIENT)
Dept: RADIOLOGY | Facility: HOSPITAL | Age: 39
Discharge: HOME | End: 2024-12-17
Attending: STUDENT IN AN ORGANIZED HEALTH CARE EDUCATION/TRAINING PROGRAM
Payer: COMMERCIAL

## 2024-12-17 VITALS
HEART RATE: 113 BPM | BODY MASS INDEX: 40.27 KG/M2 | WEIGHT: 250.56 LBS | SYSTOLIC BLOOD PRESSURE: 108 MMHG | HEIGHT: 66 IN | DIASTOLIC BLOOD PRESSURE: 77 MMHG

## 2024-12-17 DIAGNOSIS — M25.561 RIGHT KNEE PAIN, UNSPECIFIED CHRONICITY: ICD-10-CM

## 2024-12-17 DIAGNOSIS — M25.561 MECHANICAL KNEE PAIN, RIGHT: Primary | ICD-10-CM

## 2024-12-17 PROCEDURE — 73564 X-RAY EXAM KNEE 4 OR MORE: CPT | Mod: TC,50

## 2024-12-17 PROCEDURE — 3061F NEG MICROALBUMINURIA REV: CPT | Mod: CPTII,S$GLB,, | Performed by: STUDENT IN AN ORGANIZED HEALTH CARE EDUCATION/TRAINING PROGRAM

## 2024-12-17 PROCEDURE — 3008F BODY MASS INDEX DOCD: CPT | Mod: CPTII,S$GLB,, | Performed by: STUDENT IN AN ORGANIZED HEALTH CARE EDUCATION/TRAINING PROGRAM

## 2024-12-17 PROCEDURE — 3044F HG A1C LEVEL LT 7.0%: CPT | Mod: CPTII,S$GLB,, | Performed by: STUDENT IN AN ORGANIZED HEALTH CARE EDUCATION/TRAINING PROGRAM

## 2024-12-17 PROCEDURE — 3074F SYST BP LT 130 MM HG: CPT | Mod: CPTII,S$GLB,, | Performed by: STUDENT IN AN ORGANIZED HEALTH CARE EDUCATION/TRAINING PROGRAM

## 2024-12-17 PROCEDURE — 1160F RVW MEDS BY RX/DR IN RCRD: CPT | Mod: CPTII,S$GLB,, | Performed by: STUDENT IN AN ORGANIZED HEALTH CARE EDUCATION/TRAINING PROGRAM

## 2024-12-17 PROCEDURE — 1159F MED LIST DOCD IN RCRD: CPT | Mod: CPTII,S$GLB,, | Performed by: STUDENT IN AN ORGANIZED HEALTH CARE EDUCATION/TRAINING PROGRAM

## 2024-12-17 PROCEDURE — 4010F ACE/ARB THERAPY RXD/TAKEN: CPT | Mod: CPTII,S$GLB,, | Performed by: STUDENT IN AN ORGANIZED HEALTH CARE EDUCATION/TRAINING PROGRAM

## 2024-12-17 PROCEDURE — 3066F NEPHROPATHY DOC TX: CPT | Mod: CPTII,S$GLB,, | Performed by: STUDENT IN AN ORGANIZED HEALTH CARE EDUCATION/TRAINING PROGRAM

## 2024-12-17 PROCEDURE — 99999 PR PBB SHADOW E&M-EST. PATIENT-LVL IV: CPT | Mod: PBBFAC,,, | Performed by: STUDENT IN AN ORGANIZED HEALTH CARE EDUCATION/TRAINING PROGRAM

## 2024-12-17 PROCEDURE — 73564 X-RAY EXAM KNEE 4 OR MORE: CPT | Mod: 26,,, | Performed by: RADIOLOGY

## 2024-12-17 PROCEDURE — 3078F DIAST BP <80 MM HG: CPT | Mod: CPTII,S$GLB,, | Performed by: STUDENT IN AN ORGANIZED HEALTH CARE EDUCATION/TRAINING PROGRAM

## 2024-12-17 PROCEDURE — 99204 OFFICE O/P NEW MOD 45 MIN: CPT | Mod: S$GLB,,, | Performed by: STUDENT IN AN ORGANIZED HEALTH CARE EDUCATION/TRAINING PROGRAM

## 2024-12-17 NOTE — PROGRESS NOTES
Subjective:          Chief Complaint: Karuna Sandoval is a 39 y.o. female who had concerns including Pain of the Right Knee.    HPI 12/17/2024   CHIEF COMPLAINT:- Client presents for initial evaluation of right knee pain that started a few weeks ago.    HPI:Client presents with right knee pain that started a few weeks ago without any known injury. The pain is exacerbated by walking down stairs, with her stating that she is unable to walk down the stairs. It does not lessen throughout the day and affects daily activities, particularly navigating stairs. When first getting up in the morning, she has to walk cautiously and experiences pain when straightening her leg.She denies any swelling, catching, locking, or giving out of the knee, describing it as more of a generalized pain. She also denies any medical diagnoses.    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 The pain does not lessen throughout the day, affecting mobility at work, especially during the arena job where standing for long periods is required.      Past Medical History:   Diagnosis Date    Hypertension        Current Outpatient Medications on File Prior to Visit   Medication Sig Dispense Refill    dicyclomine (BENTYL) 10 MG capsule Take 10 mg by mouth. (Patient not taking: Reported on 12/17/2024)      famotidine (PEPCID) 20 MG tablet Take 20 mg by mouth 2 (two) times daily. (Patient not taking: Reported on 12/17/2024)      hydroCHLOROthiazide (HYDRODIURIL) 25 MG tablet Take 1 tab po daily for high blood pressure (Patient not taking: Reported on 12/17/2024) 90 tablet 3    lisinopriL (PRINIVIL,ZESTRIL) 40 MG tablet Take 1  tablet (40 mg total) by mouth once daily. (Patient not taking: Reported on 2024) 90 tablet 3    loratadine (CLARITIN) 10 mg tablet Take 1 tablet (10 mg total) by mouth once daily. 90 tablet 3    metFORMIN (GLUCOPHAGE-XR) 500 MG ER 24hr tablet Take 1 tablet (500 mg total) by mouth 2 (two) times daily with meals. (Patient not taking: Reported on 2024) 180 tablet 3     No current facility-administered medications on file prior to visit.       Past Surgical History:   Procedure Laterality Date     SECTION  2006    HYSTERECTOMY         Family History   Problem Relation Name Age of Onset    Hypertension Mother Marcy     Cancer Maternal Grandmother Christiane     Diabetes Maternal Grandmother Christiane     Stroke Maternal Grandmother Christiane     Colon cancer Neg Hx      Esophageal cancer Neg Hx         Social History     Socioeconomic History    Marital status: Single   Tobacco Use    Smoking status: Never     Passive exposure: Never    Smokeless tobacco: Never   Substance and Sexual Activity    Alcohol use: Yes     Alcohol/week: 1.0 standard drink of alcohol     Types: 1 Unspecified drink type per week    Drug use: No    Sexual activity: Yes     Partners: Female     Birth control/protection: None   Social History Narrative    N/a per the patient.      Social Drivers of Health     Financial Resource Strain: Low Risk  (2024)    Overall Financial Resource Strain (CARDIA)     Difficulty of Paying Living Expenses: Not hard at all   Food Insecurity: No Food Insecurity (2024)    Hunger Vital Sign     Worried About Running Out of Food in the Last Year: Never true     Ran Out of Food in the Last Year: Never true   Physical Activity: Inactive (2024)    Exercise Vital Sign     Days of Exercise per Week: 0 days     Minutes of Exercise per Session: 0 min   Stress: Stress Concern Present (2024)    [employer] - Occupational Stress Questionnaire     Feeling of Stress : Very  much   Housing Stability: Unknown (9/30/2024)    Housing Stability Vital Sign     Unable to Pay for Housing in the Last Year: No       Review of Systems   Constitutional: Negative.   HENT: Negative.     Eyes: Negative.    Cardiovascular: Negative.    Respiratory: Negative.     Endocrine: Negative.    Hematologic/Lymphatic: Negative.    Skin: Negative.    Musculoskeletal:  Positive for joint pain (right knee), joint swelling, muscle weakness and myalgias. Negative for falls.   Neurological: Negative.    Psychiatric/Behavioral: Negative.     Allergic/Immunologic: Negative.        Pain Related Questions  Over the past 3 days, what was your average pain during activity? (I.e. running, jogging, walking, climbing stairs, getting dressed, ect.): 9  Over the past 3 days, what was your highest pain level?: 6  Over the past 3 days, what was your lowest pain level? : 4    Other  How many nights a week are you awakened by your affected body part?: 0  Was the patient's HEIGHT measured or patient reported?: Patient Reported  Was the patient's WEIGHT measured or patient reported?: Measured      Objective:        General: Karuna is well-developed, well-nourished, appears stated age, in no acute distress, alert and oriented to time, place and person.     General    Nursing note and vitals reviewed.  Constitutional: She is oriented to person, place, and time. She appears well-developed and well-nourished. No distress.   HENT:   Head: Normocephalic and atraumatic.   Nose: Nose normal.   Eyes: EOM are normal.   Cardiovascular:  Intact distal pulses.            Pulmonary/Chest: Effort normal. No respiratory distress.   Neurological: She is alert and oriented to person, place, and time.   Psychiatric: She has a normal mood and affect. Her behavior is normal. Judgment and thought content normal.     General Musculoskeletal Exam   Gait: normal       Right Knee Exam     Inspection   Erythema: absent  Scars: absent  Swelling:  absent  Effusion: present  Deformity: absent  Bruising: absent    Tenderness   The patient is tender to palpation of the medial joint line and lateral joint line.    Range of Motion   Extension:  -5   Flexion:  130     Tests   Meniscus   Elsie:  Medial - positive Lateral - negative  Ligament Examination   Lachman: normal (-1 to 2mm)   PCL-Posterior Drawer: normal (0 to 2mm)     MCL - Valgus: normal (0 to 2mm)  LCL - Varus: normal  Patella   Patellar apprehension: negative  Passive Patellar Tilt: neutral  Patellar Tracking: normal    Other   Sensation: normal    Left Knee Exam     Inspection   Erythema: absent  Scars: absent  Swelling: absent  Effusion: absent  Deformity: absent  Bruising: absent    Tenderness   The patient is experiencing no tenderness.     Range of Motion   Extension:  -5   Flexion:  140     Tests   Meniscus   Elsie:  Medial - negative Lateral - negative  Stability   Lachman: normal (-1 to 2mm)   PCL-Posterior Drawer: normal (0 to 2mm)  MCL - Valgus: normal (0 to 2mm)  LCL - Varus: normal (0 to 2mm)  Patella   Patellar apprehension: negative  Passive Patellar Tilt: neutral  Patellar Tracking: normal    Other   Sensation: normal    Muscle Strength   Right Lower Extremity   Quadriceps:  5/5   Hamstrin/5   Left Lower Extremity   Quadriceps:  5/5   Hamstrin/5     Vascular Exam     Right Pulses  Dorsalis Pedis:      2+  Posterior Tibial:      2+        Left Pulses  Dorsalis Pedis:      2+  Posterior Tibial:      2+        Edema  Right Lower Leg: absent  Left Lower Leg: absent    Imaging:   X-rays of the bilateral knees from 2024 personally viewed by me on that day.  These include weight-bearing AP, PA flexion, lateral, Merchant views.  Joint space is well-maintained.  No fracture.  No bony abnormality.          Assessment:     Karuna Sandoval is a 39 y.o. female with right knee pain concerning for possible medial meniscal etiology  Encounter Diagnoses   Name Primary?     Right knee pain, unspecified chronicity     Mechanical knee pain, right Yes          Plan:       Assessment & Plan    IMAGING ORDERS:  - Ordered MRI of the right knee to evaluate for potential meniscal damage.    FOLLOW UP:  - Follow up after MRI results are available to review findings and discuss treatment plan.         This note was generated with the assistance of ambient listening technology. Verbal consent was obtained by the patient and accompanying visitor(s) for the recording of patient appointment to facilitate this note. I attest to having reviewed and edited the generated note for accuracy, though some syntax or spelling errors may persist. Please contact the author of this note for any clarification.

## 2024-12-20 ENCOUNTER — PATIENT OUTREACH (OUTPATIENT)
Dept: RESEARCH | Facility: CLINIC | Age: 39
End: 2024-12-20
Payer: COMMERCIAL

## 2024-12-20 NOTE — PROGRESS NOTES
12/20/2024  8:11 AM    Patient Name Karuna Sandoval   Appointment Department [facility] PROP WEIGHT MANAGEMENT  Visit Type Audio (Virtual visit)    Clinic Weights   Wt Readings from Last 3 Encounters:   12/17/24 1305 113.6 kg (250 lb 8.8 oz)   10/15/24 1038 115.6 kg (254 lb 13.6 oz)   04/12/24 0934 112.3 kg (247 lb 9.2 oz)     Last Reported Weights No data was found      [facility] INTERVENTION CONTACT:   Method of contact:  Phone Call   or Participant-Initiated Contact?:  -initiated contact  Type of intervention Contact:  Scheduled Session  Did the participant attend session?: Yes    Was the patient called within 15 minutes of the scheduled appointment?:  Yes  Is this a make-up session?: No    Which session materials covered in session?:  Session 38  Patient Reported Weight (From External Bita):  -6 (11/27/24 - 252 lbs)  Time workout: not reported.  Average Daily Steps: not reported.  Calorie Prescription::  2000  Safety Criteria Triggered:  None  Toolbox Triggered:  Adherence to diet  Adherence to diet: Health  must choose at least two interventions from list::  Use of motivational interviewing and Modify eating behavior and meal patterns  Weight Graph Stoplight Status for Dietary Adherence:  Red Light       Goals    None          Additional Notes:    Patient is not feeling well today. Recovering from the flu sine last week. Not able to carry out the functions she would like to - mostly sleeping.     MRI scheduled for knee next week. Hopeful for answers about what is driving the pain.     Did not discuss goals. Agrees to resume weighing when able.     Tamika Carrasco, [facility]  [employer] [facility]   [phone]

## 2025-01-17 ENCOUNTER — PATIENT OUTREACH (OUTPATIENT)
Dept: RESEARCH | Facility: CLINIC | Age: 40
End: 2025-01-17
Payer: COMMERCIAL

## 2025-01-17 NOTE — PROGRESS NOTES
"01/17/2025  9:20 AM    Patient Name Karuna Sandoval   Appointment Department [facility] PROPBREONNA WEIGHT MANAGEMENT  Visit Type Audio (Virtual visit)    Clinic Weights   Wt Readings from Last 3 Encounters:   12/17/24 1305 113.6 kg (250 lb 8.8 oz)   10/15/24 1038 115.6 kg (254 lb 13.6 oz)   04/12/24 0934 112.3 kg (247 lb 9.2 oz)     Last Reported Weights No data was found      [facility] INTERVENTION CONTACT:   Method of contact:  Phone Call   or Participant-Initiated Contact?:  -initiated contact  Type of intervention Contact:  Scheduled Session  Did the participant attend session?: Yes    Was the patient called within 15 minutes of the scheduled appointment?:  Yes  Is this a make-up session?: No    Which session materials covered in session?:  Session 39  Patient Reported Weight (From External Biat):  -6 (self reported 1/17/25 - 246.5 lbs)  Time workout: not reported.  Average Daily Steps: not reported.  Calorie Prescription::  2000  Safety Criteria Triggered:  None  Toolbox Triggered:  Adherence to diet  Adherence to diet: Health  must choose at least two interventions from list::  Reduce portion size and Modify eating behavior and meal patterns  Weight Graph Stoplight Status for Dietary Adherence:  Red Light       Goals    None          Additional Notes:    Patient reports having a "rough" start to 2025 for reasons she did not explain at this time.     Planning to start physical therapy for her knee at the beginning of February.     Patient explained that as of Monday 1/13, she started the "carnivore diet," and has lost ~2 lbs. Previously she has mentioned difficulty with digesting meat but is doing well with this so far. Eating eggs and sausage for breakfast. Having some berries and avocado as well.     Goals:   1) Continue to follow 2,000 calorie diet    Tamika Carrasco [facility]  [facility]   [phone]    "

## 2025-02-14 ENCOUNTER — PATIENT OUTREACH (OUTPATIENT)
Dept: RESEARCH | Facility: CLINIC | Age: 40
End: 2025-02-14
Payer: COMMERCIAL

## 2025-02-14 NOTE — PROGRESS NOTES
02/14/2025  8:26 AM    Patient Name Karuna Sandoval   Appointment Department [facility] PROPEL WEIGHT MANAGEMENT  Visit Type Audio (Virtual visit)    Clinic Weights   Wt Readings from Last 3 Encounters:   12/17/24 1305 113.6 kg (250 lb 8.8 oz)   10/15/24 1038 115.6 kg (254 lb 13.6 oz)   04/12/24 0934 112.3 kg (247 lb 9.2 oz)     Last Reported Weights No data was found      [facility] PROP INTERVENTION CONTACT:   Method of contact:  Phone Call   or Participant-Initiated Contact?:  -initiated contact  Type of intervention Contact:  Scheduled Session  Did the participant attend session?: Yes    Was the patient called within 15 minutes of the scheduled appointment?:  Yes  Is this a make-up session?: No    Which session materials covered in session?:  Session 40  Patient Reported Weight (From External Bita):  248  Time workout: not reported.  Average Daily Steps: not reported.  Calorie Prescription::  2000  Safety Criteria Triggered:  None  Toolbox Triggered:  Adherence to diet  Adherence to diet: Health  must choose at least two interventions from list::  Reduce portion size and Modify eating behavior and meal patterns  Weight Graph Stoplight Status for Dietary Adherence:  Red Light       Goals    None          Additional Notes:    Patient reports doing well overall but may be getting sick with a cold d/t weather changes. States she is still tired from working over the Super Bowl and has not been following her carnivore diet with fidelity.     She believes the diet is working for her and she has almost stopped eating red meat as a part of it. Eating mostly ground chicken and berries. She wants to continue to follow the diet until she sees more progress toward her goals.     Notes that knee feels better and waiting on MRI results next week.     Goal:   1) Continue to follow diet (2,000 padmini/day)    Tamika Carrasco [facility]  [facility]   [phone]

## 2025-03-10 ENCOUNTER — PATIENT MESSAGE (OUTPATIENT)
Dept: FAMILY MEDICINE | Facility: CLINIC | Age: 40
End: 2025-03-10
Payer: COMMERCIAL

## 2025-03-14 ENCOUNTER — PATIENT OUTREACH (OUTPATIENT)
Dept: RESEARCH | Facility: CLINIC | Age: 40
End: 2025-03-14
Payer: COMMERCIAL

## 2025-03-14 NOTE — PROGRESS NOTES
03/14/2025  8:06 AM    Patient Name Karuna Sandoval   Appointment Department [facility] PROP WEIGHT MANAGEMENT  Visit Type Audio (Virtual visit)    Clinic Weights   Wt Readings from Last 3 Encounters:   12/17/24 1305 113.6 kg (250 lb 8.8 oz)   10/15/24 1038 115.6 kg (254 lb 13.6 oz)   04/12/24 0934 112.3 kg (247 lb 9.2 oz)     Last Reported Weights No data was found      [facility] INTERVENTION CONTACT:   Method of contact:  Phone Call   or Participant-Initiated Contact?:  -initiated contact  Type of intervention Contact:  Scheduled Session  Did the participant attend session?: No    If no, please select a reason::  Illness  Safety Criteria Triggered:  None  Weight Graph Stoplight Status for Dietary Adherence:  Red Light       Goals    None          Additional Notes:    Patient would like to reschedule d/t illness. Rescheduled for next week on 3/20 at 8am.     Tamika Carrasco [facility]  [employer]   [phone]

## 2025-03-20 ENCOUNTER — PATIENT OUTREACH (OUTPATIENT)
Dept: RESEARCH | Facility: CLINIC | Age: 40
End: 2025-03-20
Payer: COMMERCIAL

## 2025-03-20 NOTE — PROGRESS NOTES
03/20/2025  8:08 AM    Patient Name Karuna Sandoval   Appointment Department [facility] PROP WEIGHT MANAGEMENT  Visit Type Audio (Virtual visit)    Clinic Weights   Wt Readings from Last 3 Encounters:   12/17/24 1305 113.6 kg (250 lb 8.8 oz)   10/15/24 1038 115.6 kg (254 lb 13.6 oz)   04/12/24 0934 112.3 kg (247 lb 9.2 oz)     Last Reported Weights No data was found      [facility] PROP INTERVENTION CONTACT:   Method of contact:  Phone Call   or Participant-Initiated Contact?:  -initiated contact  Type of intervention Contact:  Scheduled Session  Did the participant attend session?: Yes    Was the patient called within 15 minutes of the scheduled appointment?:  Yes  Is this a make-up session?: No    Which session materials covered in session?:  Session 41 and Session 42  Patient Reported Weight (From External Bita):  241  Time workout: not reported.  Average Daily Steps: not reported.  Calorie Prescription::  2000  Safety Criteria Triggered:  None  Toolbox Triggered:  Adherence to diet  Adherence to diet: Health  must choose at least two interventions from list::  Reduce portion size and Modify eating behavior and meal patterns  Weight Graph Stoplight Status for Dietary Adherence:  Red Light       Goals    None          Additional Notes:    Patient reports feeling better from last week - suspected food poisoning. States that her knee has good and bad days, and her other knee is sore from compensating.     States that she is mostly following her carnivore diet (exception for birthdays/celebrations). Attributes weight loss to this. Not able to exercise at this time.     Would like to schedule physical therapy - scheduling barrier.     Goal:   1) Continue to follow 2,000 calorie diet    Tamika Carrasco [facility]  [facility]   [phone]

## 2025-04-11 ENCOUNTER — LAB VISIT (OUTPATIENT)
Dept: LAB | Facility: HOSPITAL | Age: 40
End: 2025-04-11
Attending: FAMILY MEDICINE
Payer: COMMERCIAL

## 2025-04-11 DIAGNOSIS — I10 ESSENTIAL HYPERTENSION: ICD-10-CM

## 2025-04-11 DIAGNOSIS — R73.03 PREDIABETES: ICD-10-CM

## 2025-04-11 DIAGNOSIS — N18.31 STAGE 3A CHRONIC KIDNEY DISEASE: ICD-10-CM

## 2025-04-11 LAB
ABSOLUTE EOSINOPHIL (OHS): 0.08 K/UL
ABSOLUTE MONOCYTE (OHS): 0.45 K/UL (ref 0.3–1)
ABSOLUTE NEUTROPHIL COUNT (OHS): 3.33 K/UL (ref 1.8–7.7)
ALBUMIN SERPL BCP-MCNC: 4.2 G/DL (ref 3.5–5.2)
ALP SERPL-CCNC: 48 UNIT/L (ref 40–150)
ALT SERPL W/O P-5'-P-CCNC: 21 UNIT/L (ref 10–44)
ANION GAP (OHS): 11 MMOL/L (ref 8–16)
AST SERPL-CCNC: 18 UNIT/L (ref 11–45)
BASOPHILS # BLD AUTO: 0.03 K/UL
BASOPHILS NFR BLD AUTO: 0.4 %
BILIRUB DIRECT SERPL-MCNC: 0.1 MG/DL (ref 0.1–0.3)
BILIRUB SERPL-MCNC: 0.2 MG/DL (ref 0.1–1)
BUN SERPL-MCNC: 23 MG/DL (ref 6–20)
CALCIUM SERPL-MCNC: 9.7 MG/DL (ref 8.7–10.5)
CHLORIDE SERPL-SCNC: 103 MMOL/L (ref 95–110)
CHOLEST SERPL-MCNC: 147 MG/DL (ref 120–199)
CHOLEST/HDLC SERPL: 4.6 {RATIO} (ref 2–5)
CO2 SERPL-SCNC: 25 MMOL/L (ref 23–29)
CREAT SERPL-MCNC: 1.4 MG/DL (ref 0.5–1.4)
EAG (OHS): 120 MG/DL (ref 68–131)
ERYTHROCYTE [DISTWIDTH] IN BLOOD BY AUTOMATED COUNT: 16.7 % (ref 11.5–14.5)
GFR SERPLBLD CREATININE-BSD FMLA CKD-EPI: 48 ML/MIN/1.73/M2
GLUCOSE SERPL-MCNC: 102 MG/DL (ref 70–110)
HBA1C MFR BLD: 5.8 % (ref 4–5.6)
HCT VFR BLD AUTO: 41 % (ref 37–48.5)
HDLC SERPL-MCNC: 32 MG/DL (ref 40–75)
HDLC SERPL: 21.8 % (ref 20–50)
HGB BLD-MCNC: 12.4 GM/DL (ref 12–16)
IMM GRANULOCYTES # BLD AUTO: 0.03 K/UL (ref 0–0.04)
IMM GRANULOCYTES NFR BLD AUTO: 0.4 % (ref 0–0.5)
LDLC SERPL CALC-MCNC: 88 MG/DL (ref 63–159)
LYMPHOCYTES # BLD AUTO: 3.26 K/UL (ref 1–4.8)
MCH RBC QN AUTO: 21.9 PG (ref 27–31)
MCHC RBC AUTO-ENTMCNC: 30.2 G/DL (ref 32–36)
MCV RBC AUTO: 72 FL (ref 82–98)
NONHDLC SERPL-MCNC: 115 MG/DL
NUCLEATED RBC (/100WBC) (OHS): 0 /100 WBC
PHOSPHATE SERPL-MCNC: 3.9 MG/DL (ref 2.7–4.5)
PLATELET # BLD AUTO: 371 K/UL (ref 150–450)
PMV BLD AUTO: 10.6 FL (ref 9.2–12.9)
POTASSIUM SERPL-SCNC: 3.7 MMOL/L (ref 3.5–5.1)
PROT SERPL-MCNC: 7.9 GM/DL (ref 6–8.4)
RBC # BLD AUTO: 5.67 M/UL (ref 4–5.4)
RELATIVE EOSINOPHIL (OHS): 1.1 %
RELATIVE LYMPHOCYTE (OHS): 45.4 % (ref 18–48)
RELATIVE MONOCYTE (OHS): 6.3 % (ref 4–15)
RELATIVE NEUTROPHIL (OHS): 46.4 % (ref 38–73)
SODIUM SERPL-SCNC: 139 MMOL/L (ref 136–145)
TRIGL SERPL-MCNC: 135 MG/DL (ref 30–150)
TSH SERPL-ACNC: 1.77 UIU/ML (ref 0.4–4)
WBC # BLD AUTO: 7.18 K/UL (ref 3.9–12.7)

## 2025-04-11 PROCEDURE — 84520 ASSAY OF UREA NITROGEN: CPT

## 2025-04-11 PROCEDURE — 85025 COMPLETE CBC W/AUTO DIFF WBC: CPT

## 2025-04-11 PROCEDURE — 83036 HEMOGLOBIN GLYCOSYLATED A1C: CPT

## 2025-04-11 PROCEDURE — 84478 ASSAY OF TRIGLYCERIDES: CPT

## 2025-04-11 PROCEDURE — 36415 COLL VENOUS BLD VENIPUNCTURE: CPT | Mod: PO

## 2025-04-11 PROCEDURE — 84100 ASSAY OF PHOSPHORUS: CPT

## 2025-04-11 PROCEDURE — 82040 ASSAY OF SERUM ALBUMIN: CPT

## 2025-04-11 PROCEDURE — 84443 ASSAY THYROID STIM HORMONE: CPT

## 2025-04-15 ENCOUNTER — OFFICE VISIT (OUTPATIENT)
Dept: FAMILY MEDICINE | Facility: CLINIC | Age: 40
End: 2025-04-15
Payer: COMMERCIAL

## 2025-04-15 VITALS
HEIGHT: 66 IN | WEIGHT: 241.88 LBS | SYSTOLIC BLOOD PRESSURE: 100 MMHG | HEART RATE: 79 BPM | BODY MASS INDEX: 38.87 KG/M2 | OXYGEN SATURATION: 96 % | DIASTOLIC BLOOD PRESSURE: 70 MMHG

## 2025-04-15 DIAGNOSIS — E66.1 CLASS 2 DRUG-INDUCED OBESITY WITH SERIOUS COMORBIDITY AND BODY MASS INDEX (BMI) OF 39.0 TO 39.9 IN ADULT: ICD-10-CM

## 2025-04-15 DIAGNOSIS — R73.03 PREDIABETES: ICD-10-CM

## 2025-04-15 DIAGNOSIS — N18.31 STAGE 3A CHRONIC KIDNEY DISEASE: ICD-10-CM

## 2025-04-15 DIAGNOSIS — Z12.31 ENCOUNTER FOR SCREENING MAMMOGRAM FOR BREAST CANCER: ICD-10-CM

## 2025-04-15 DIAGNOSIS — K58.1 IRRITABLE BOWEL SYNDROME WITH CONSTIPATION: ICD-10-CM

## 2025-04-15 DIAGNOSIS — I10 ESSENTIAL HYPERTENSION: ICD-10-CM

## 2025-04-15 DIAGNOSIS — E66.812 CLASS 2 DRUG-INDUCED OBESITY WITH SERIOUS COMORBIDITY AND BODY MASS INDEX (BMI) OF 39.0 TO 39.9 IN ADULT: ICD-10-CM

## 2025-04-15 DIAGNOSIS — F33.1 MODERATE EPISODE OF RECURRENT MAJOR DEPRESSIVE DISORDER: ICD-10-CM

## 2025-04-15 DIAGNOSIS — Z00.01 ENCOUNTER FOR GENERAL ADULT MEDICAL EXAMINATION WITH ABNORMAL FINDINGS: Primary | ICD-10-CM

## 2025-04-15 PROBLEM — E66.01 CLASS 2 SEVERE OBESITY DUE TO EXCESS CALORIES WITH SERIOUS COMORBIDITY AND BODY MASS INDEX (BMI) OF 39.0 TO 39.9 IN ADULT: Status: RESOLVED | Noted: 2023-03-29 | Resolved: 2025-04-15

## 2025-04-15 PROCEDURE — 4010F ACE/ARB THERAPY RXD/TAKEN: CPT | Mod: CPTII,S$GLB,, | Performed by: FAMILY MEDICINE

## 2025-04-15 PROCEDURE — 99999 PR PBB SHADOW E&M-EST. PATIENT-LVL IV: CPT | Mod: PBBFAC,,, | Performed by: FAMILY MEDICINE

## 2025-04-15 PROCEDURE — 3008F BODY MASS INDEX DOCD: CPT | Mod: CPTII,S$GLB,, | Performed by: FAMILY MEDICINE

## 2025-04-15 PROCEDURE — 1159F MED LIST DOCD IN RCRD: CPT | Mod: CPTII,S$GLB,, | Performed by: FAMILY MEDICINE

## 2025-04-15 PROCEDURE — 3044F HG A1C LEVEL LT 7.0%: CPT | Mod: CPTII,S$GLB,, | Performed by: FAMILY MEDICINE

## 2025-04-15 PROCEDURE — 1160F RVW MEDS BY RX/DR IN RCRD: CPT | Mod: CPTII,S$GLB,, | Performed by: FAMILY MEDICINE

## 2025-04-15 PROCEDURE — 3074F SYST BP LT 130 MM HG: CPT | Mod: CPTII,S$GLB,, | Performed by: FAMILY MEDICINE

## 2025-04-15 PROCEDURE — 3078F DIAST BP <80 MM HG: CPT | Mod: CPTII,S$GLB,, | Performed by: FAMILY MEDICINE

## 2025-04-15 PROCEDURE — 99396 PREV VISIT EST AGE 40-64: CPT | Mod: S$GLB,,, | Performed by: FAMILY MEDICINE

## 2025-04-15 RX ORDER — HYDROCHLOROTHIAZIDE 25 MG/1
TABLET ORAL
Qty: 90 TABLET | Refills: 3 | Status: CANCELLED | OUTPATIENT
Start: 2025-04-15

## 2025-04-15 RX ORDER — LISINOPRIL 40 MG/1
40 TABLET ORAL DAILY
Qty: 90 TABLET | Refills: 3 | Status: SHIPPED | OUTPATIENT
Start: 2025-04-15

## 2025-04-15 NOTE — PROGRESS NOTES
"Subjective:         Patient ID: Karuna Sandoval is a 40 y.o. female.    Chief Complaint: Annual Exam    Patient Active Problem List   Diagnosis    Thyromegaly    Essential hypertension    Microcytosis    Stage 3a chronic kidney disease    Research subject    Moderate episode of recurrent major depressive disorder    Irritable bowel syndrome with constipation    Prediabetes    Low back pain radiating to right lower extremity      HPI    Karuna is a 40 y.o. female    History of Present Illness    CHIEF COMPLAINT:  Karuna presents today for annual exam    CURRENT SYMPTOMS:  She reports experiencing headaches for the past month, described as a pressure-like sensation. She denies associated nausea, dizziness, or lightheadedness. She recently had her vision checked due to these concerns.    MEDICAL HISTORY:  She has chronic kidney disease stage 3. She has a small liver cyst and a 1.3 cm gallstone. She previously experienced severe abdominal pain which has since resolved completely.    LABS:  Liver enzymes are normal. Cholesterol profile shows low HDL but otherwise good. Thyroid function is normal. A1C has improved from 5.9 to 5.8 over past six months, indicating prediabetes. She is not anemic but has persistently small RBCs, likely due to genetic factors.    DIET:  She has been following a carnivore diet for 3 months, consisting mainly of chicken and eggs with occasional shrimp and fish. She reports stomach discomfort with red meat consumption. She occasionally incorporates vegetables in the form of salads.    MEDICATIONS:  She takes Lisinopril 40 mg and minimizes salt intake, avoiding added salt in cooking but occasionally using low-sodium seasonings.               Objective:     Vitals:    04/15/25 1010   BP: 100/70   BP Location: Left arm   Patient Position: Sitting   Pulse: 79   SpO2: 96%   Weight: 109.7 kg (241 lb 13.5 oz)   Height: 5' 6" (1.676 m)         Physical Exam  Vitals and nursing note reviewed. "   Constitutional:       General: She is not in acute distress.     Appearance: Normal appearance. She is not ill-appearing, toxic-appearing or diaphoretic.   HENT:      Head: Normocephalic and atraumatic.   Eyes:      General: No scleral icterus.     Conjunctiva/sclera: Conjunctivae normal.   Cardiovascular:      Rate and Rhythm: Normal rate.      Heart sounds: Normal heart sounds. No murmur heard.  Pulmonary:      Effort: Pulmonary effort is normal. No respiratory distress.   Skin:     Coloration: Skin is not pale.   Neurological:      Mental Status: She is alert. Mental status is at baseline.   Psychiatric:         Attention and Perception: Attention and perception normal.         Mood and Affect: Mood and affect normal.         Speech: Speech normal.         Behavior: Behavior normal.         Cognition and Memory: Cognition and memory normal.         Judgment: Judgment normal.         Assessment:       1. Encounter for general adult medical examination with abnormal findings    2. Essential hypertension    3. Prediabetes    4. Stage 3a chronic kidney disease    5. Class 2 drug-induced obesity with serious comorbidity and body mass index (BMI) of 39.0 to 39.9 in adult    6. Irritable bowel syndrome with constipation    7. Moderate episode of recurrent major depressive disorder    8. Encounter for screening mammogram for breast cancer          Plan:   Recent relevant labs results reviewed with patient.         Assessment & Plan    Assessed BP as low, potentially causing headaches.  Adjusted antihypertensive regimen due to weight loss and low BP readings.  Evaluated lab results: liver enzymes, cholesterol, thyroid, and HbA1c.  Noted improvement in prediabetes (HbA1c from 5.9 to 5.8).  Acknowledged ongoing CKD stage 3.  Considered carnivore diet and potential nutritional deficiencies.    CHRONIC KIDNEY DISEASE, STAGE 3:  - Order ultrasound of kidneys if not done previously.  - Schedule labs, including renal function  and phosphorus levels, in 6 months.  - Monitor blood pressure and adjust medication accordingly.  - Discontinue hydrochlorothiazide 25 mg and maintain lisinopril 40 mg daily    LIVER DISEASE:  - Monitor liver enzymes, which were checked and found to be normal.  - Ultrasound revealed a small cyst on the liver, not of significant concern.    HEADACHE:  - Monitor patient's reported headaches, with one occurring during the visit.  - Karuna describes headaches as pressure, without associated nausea.  - Vision exam updated and found normal.  - Low blood pressure might be causing the headaches.  - Instruct patient to monitor blood pressure daily, especially during headache episodes.  - Recommend adjusting blood pressure medication as needed.  - Start multivitamin (women's or women's over 50 formulation) daily    DIET AND LIFESTYLE RECOMMENDATIONS:  - Karuna to continue carnivore diet with occasional inclusion of vegetables for fiber intake and varying protein sources, including fish and shrimp.  - Explained potential benefits of transitioning to a paleo-like diet with vegetable sides for fiber intake.  - Recommend using no-salt seasoning options to minimize sodium intake.    FOLLOW-UP:  - Schedule a follow up in 6 months with a virtual check-in to review blood pressure readings and headache status.  - Karuna to contact the office if BP remains consistently low after medication changes.   Virtual visit with DAYANARA carmichael blood pressure and headache in 2-3 visit        1. Encounter for general adult medical examination with abnormal findings  - Risk and age appropriate anticipatory guidance.  reviewed and updated. Recommendations discussed with patient as appropriate.     2. Essential hypertension  -     lisinopriL (PRINIVIL,ZESTRIL) 40 MG tablet; Take 1 tablet (40 mg total) by mouth once daily.  Dispense: 90 tablet; Refill: 3  -     Renal Function Panel; Future; Expected date: 04/15/2025  -     Hemoglobin A1C; Future;  Expected date: 04/15/2025  -     CBC Auto Differential; Future; Expected date: 04/15/2025  Over controlled, stop HCTZ. Decrease Lisinopril in future if needed    3. Prediabetes  -     Hemoglobin A1C; Future; Expected date: 04/15/2025  Improved    4. Stage 3a chronic kidney disease  -     US Retroperitoneal Complete; Future; Expected date: 04/15/2025  -     Renal Function Panel; Future; Expected date: 04/15/2025  -     Vitamin D; Future; Expected date: 04/15/2025  -     PTH, Intact; Future; Expected date: 04/15/2025  Stable    5. Class 2 drug-induced obesity with serious comorbidity and body mass index (BMI) of 39.0 to 39.9 in adult  Improvement noted    6. Irritable bowel syndrome with constipation  Stable    7. Moderate episode of recurrent major depressive disorder  Improved    8. Encounter for screening mammogram for breast cancer  Comments:  Scheduled    Patient's questions answered. Plan reviewed with patient at the end of visit. Relevant precautions to chief complaint and reasons to seek further medical care or to contact the office sooner reviewed with patient.     Follow up in about 6 months (around 10/15/2025) for Hypertension Follow-up, (prelabs).        Part of this note was dictated using voice recognition software. Please excuse any typographical errors.     This note was generated with the assistance of ambient listening technology. Verbal consent was obtained by the patient and accompanying visitor(s) for the recording of patient appointment to facilitate this note. I attest to having reviewed and edited the generated note for accuracy, though some syntax or spelling errors may persist. Please contact the author of this note for any clarification.

## 2025-04-23 ENCOUNTER — HOSPITAL ENCOUNTER (OUTPATIENT)
Dept: RADIOLOGY | Facility: HOSPITAL | Age: 40
Discharge: HOME | End: 2025-04-23
Attending: FAMILY MEDICINE
Payer: COMMERCIAL

## 2025-04-23 VITALS — HEIGHT: 66 IN | WEIGHT: 241 LBS | BODY MASS INDEX: 38.73 KG/M2

## 2025-04-23 DIAGNOSIS — Z12.31 ENCOUNTER FOR SCREENING MAMMOGRAM FOR BREAST CANCER: ICD-10-CM

## 2025-04-23 PROCEDURE — 77067 SCR MAMMO BI INCL CAD: CPT | Mod: 26,,, | Performed by: RADIOLOGY

## 2025-04-23 PROCEDURE — 77063 BREAST TOMOSYNTHESIS BI: CPT | Mod: 26,,, | Performed by: RADIOLOGY

## 2025-04-23 PROCEDURE — 77067 SCR MAMMO BI INCL CAD: CPT | Mod: TC

## 2025-04-25 ENCOUNTER — PATIENT OUTREACH (OUTPATIENT)
Dept: RESEARCH | Facility: CLINIC | Age: 40
End: 2025-04-25
Payer: COMMERCIAL

## 2025-04-25 ENCOUNTER — RESULTS FOLLOW-UP (OUTPATIENT)
Dept: FAMILY MEDICINE | Facility: CLINIC | Age: 40
End: 2025-04-25

## 2025-04-25 NOTE — PROGRESS NOTES
04/25/2025  9:25 AM    Patient Name Karuna Sandoval   Appointment Department [facility] PROPEL WEIGHT MANAGEMENT  Visit Type Audio (Virtual visit)    Clinic Weights   Wt Readings from Last 3 Encounters:   04/23/25 0948 109.3 kg (241 lb)   04/15/25 1010 109.7 kg (241 lb 13.5 oz)   12/17/24 1305 113.6 kg (250 lb 8.8 oz)     Last Reported Weights No data was found      [facility] PROP INTERVENTION CONTACT:   Method of contact:  Phone Call   or Participant-Initiated Contact?:  -initiated contact  Type of intervention Contact:  Scheduled Session  Did the participant attend session?: Yes    Was the patient called within 15 minutes of the scheduled appointment?:  Yes  Is this a make-up session?: No    Which session materials covered in session?:  Session 43  Patient Reported Weight (From External Bita):  239  Time workout: Not reported.  Average Daily Steps: Not reported.  Calorie Prescription::  2000  Safety Criteria Triggered:  None  Toolbox Triggered:  Adherence to diet  Adherence to diet: Health  must choose at least two interventions from list::  Reduce portion size and Modify eating behavior and meal patterns  Weight Graph Stoplight Status for Dietary Adherence:  Yellow Light - Above the Zone       Goals    None          Additional Notes:    Follow up call to patient for propel monthly session. Patient states she is doing well overall. Patient's weight is the same from last month. Acknowledges weight fluctuation and attributes to inconsistencies in diet/exercise due to travel.     Doing well with following her diet at home.  Has started walking/jogging and feels ready to increase her amount of exercise.    Aiming to improve her eating choices while traveling.  Has traveled booked for this summer and planning to make better health and diet choices while away from home.    Goals:  1) Continue to follow 2,000 calorie diet  2) Walking/Jogging 2-3 days/week        Tamika Carrasco, [facility]  [employer] [facility]    [phone]

## 2025-05-23 ENCOUNTER — PATIENT OUTREACH (OUTPATIENT)
Dept: RESEARCH | Facility: CLINIC | Age: 40
End: 2025-05-23
Payer: COMMERCIAL

## 2025-05-23 NOTE — PROGRESS NOTES
05/23/2025  8:07 AM    Patient Name Karuna Sandoval   Appointment Department [facility] PROPEL WEIGHT MANAGEMENT  Visit Type Audio (Virtual visit)    Clinic Weights   Wt Readings from Last 3 Encounters:   04/23/25 0948 109.3 kg (241 lb)   04/15/25 1010 109.7 kg (241 lb 13.5 oz)   12/17/24 1305 113.6 kg (250 lb 8.8 oz)     Last Reported Weights No data was found      [facility] PROP INTERVENTION CONTACT:   Method of contact:  Phone Call   or Participant-Initiated Contact?:  -initiated contact  Type of intervention Contact:  Scheduled Session  Did the participant attend session?: Yes    Was the patient called within 15 minutes of the scheduled appointment?:  Yes  Is this a make-up session?: No    Which session materials covered in session?:  Session 44  Patient Reported Weight (From External Bita):  232  Time workout: not reported.  Average Daily Steps: not reported.  Calorie Prescription::  2000  Safety Criteria Triggered:  None  Toolbox Triggered:  Adherence to diet  Adherence to diet: Health  must choose at least two interventions from list::  Reduce portion size and Modify eating behavior and meal patterns  Weight Graph Stoplight Status for Dietary Adherence:  Red Light       Goals    None          Additional Notes:    Follow up call to patient for propel monthly session. Patient states she is doing well overall. Patient's weight is decreased from last month. Pleased with weight loss and attributes to consistency with lifestyle choices. She is following a modified style carnivore diet. She was advised by her doctor to eat more fruits and vegetables.     Breakfast: meat and eggs w/berries  Lunch: mixed salads w/grilled chicken    Goals:  1) Continue to follow 2,000 calorie diet (modified carnivore diet)  2) Walking 2-3 days/week, starting swim lessons    Tamika Carrasco [facility]  [facility]   [phone]

## 2025-06-17 ENCOUNTER — PATIENT MESSAGE (OUTPATIENT)
Dept: FAMILY MEDICINE | Facility: CLINIC | Age: 40
End: 2025-06-17
Payer: COMMERCIAL

## 2025-06-20 ENCOUNTER — PATIENT OUTREACH (OUTPATIENT)
Dept: RESEARCH | Facility: CLINIC | Age: 40
End: 2025-06-20
Payer: COMMERCIAL

## 2025-06-20 NOTE — PROGRESS NOTES
06/20/2025  8:57 AM    Patient Name Karuna Sandoval   Appointment Department [facility] PROPEL WEIGHT MANAGEMENT  Visit Type Audio (Virtual visit)    Clinic Weights   Wt Readings from Last 3 Encounters:   04/23/25 0948 109.3 kg (241 lb)   04/15/25 1010 109.7 kg (241 lb 13.5 oz)   12/17/24 1305 113.6 kg (250 lb 8.8 oz)     Last Reported Weights No data was found      [facility] PROPEL INTERVENTION CONTACT:   Method of contact:  Phone Call   or Participant-Initiated Contact?:  -initiated contact  Type of intervention Contact:  Scheduled Session  Did the participant attend session?: Yes    Was the patient called within 15 minutes of the scheduled appointment?:  Yes  Is this a make-up session?: No    Which session materials covered in session?:  Session 44  Patient Reported Weight (From External Bita):  234  Time workout: not specified.  Average Daily Steps: not reported.  Calorie Prescription::  2000  Safety Criteria Triggered:  None  Toolbox Triggered:  Adherence to diet  Adherence to diet: Health  must choose at least two interventions from list::  Modify eating behavior and meal patterns and Reduce portion size  Weight Graph Stoplight Status for Dietary Adherence:  Red Light       Goals    None          Additional Notes:    Follow up call to patient for propel monthly session. Patient states she is doing well overall. Patient's weight is about the same from last month. Acknowledges weight fluctuation and attributes to inconsistencies in diet/exercise.     She is still following a modified carnivore diet eating some fruits and vegetables.  She notes that this has caused her to lose weight more slowly and she wants to resume a [place] carnivore diet in the next month or so.    She is enjoying swim lessons and doing really well with her progress.  She plans to continue this and hopes to be swimming by her birthday next year.  Also doing some walks throughout the week but not as often as she used to.    Reminded her that  next month as her final propel session, no new goals to discuss today.      Tamika Carrasco, [facility]  [employer]   [phone]

## 2025-07-01 ENCOUNTER — TELEPHONE (OUTPATIENT)
Dept: PHARMACY | Facility: CLINIC | Age: 40
End: 2025-07-01
Payer: COMMERCIAL

## 2025-07-18 ENCOUNTER — PATIENT OUTREACH (OUTPATIENT)
Dept: RESEARCH | Facility: CLINIC | Age: 40
End: 2025-07-18
Payer: COMMERCIAL

## 2025-07-18 NOTE — PROGRESS NOTES
07/18/2025  8:41 AM    Patient Name Karuna Sandoval   Appointment Department [facility] [employer]  Visit Type Audio (Virtual visit)    Clinic Weights   Wt Readings from Last 3 Encounters:   04/23/25 0948 109.3 kg (241 lb)   04/15/25 1010 109.7 kg (241 lb 13.5 oz)   12/17/24 1305 113.6 kg (250 lb 8.8 oz)     Last Reported Weights No data was found      [facility] INTERVENTION CONTACT:   Method of contact:  Phone Call   or Participant-Initiated Contact?:  -initiated contact  Type of intervention Contact:  Scheduled Session  Did the participant attend session?: Yes    Was the patient called within 15 minutes of the scheduled appointment?:  Yes  Is this a make-up session?: No    Which session materials covered in session?:  Session 44  Patient Reported Weight (From External Bita):  236  Time workout: Not reported.  Average Daily Steps: Not reported.  Calorie Prescription::  2000  Toolbox Triggered:  None  Weight Graph Stoplight Status for Dietary Adherence:  Red Light       Goals    None          Additional Notes:    Final session for [employer].  Reviewed graduation packet and answered questions.    Patient is unsure if she did complete her final surveys or received her final compensation check.  Advised her to follow-up with [employer] team.    Denies other questions or concerns for health  today.    Tamika Carrasco, [facility]  [employer]   [phone]

## 2025-08-08 ENCOUNTER — PATIENT MESSAGE (OUTPATIENT)
Dept: FAMILY MEDICINE | Facility: CLINIC | Age: 40
End: 2025-08-08
Payer: COMMERCIAL

## 2025-08-19 ENCOUNTER — TELEPHONE (OUTPATIENT)
Dept: PHARMACY | Facility: CLINIC | Age: 40
End: 2025-08-19
Payer: COMMERCIAL

## 2025-08-19 RX ORDER — METFORMIN HYDROCHLORIDE 500 MG/1
1 TABLET ORAL DAILY
COMMUNITY